# Patient Record
Sex: MALE | Race: BLACK OR AFRICAN AMERICAN | NOT HISPANIC OR LATINO | Employment: UNEMPLOYED | RURAL
[De-identification: names, ages, dates, MRNs, and addresses within clinical notes are randomized per-mention and may not be internally consistent; named-entity substitution may affect disease eponyms.]

---

## 2018-07-30 ENCOUNTER — HISTORICAL (OUTPATIENT)
Dept: ADMINISTRATIVE | Facility: HOSPITAL | Age: 38
End: 2018-07-30

## 2018-07-31 LAB
LAB AP CLINICAL INFORMATION: NORMAL
LAB AP COMMENTS: NORMAL
LAB AP DIAGNOSIS - HISTORICAL: NORMAL
LAB AP MICROSCOPIC PATHOLOGY - HISTORICAL: NORMAL
LAB AP SPECIMEN SUBMITTED - HISTORICAL: NORMAL

## 2018-08-10 LAB
LAB AP CLINICAL INFORMATION: NORMAL
LAB AP COMMENTS: NORMAL
LAB AP DIAGNOSIS - HISTORICAL: NORMAL
LAB AP GROSS PATHOLOGY - HISTORICAL: NORMAL
LAB AP SPECIMEN SUBMITTED - HISTORICAL: NORMAL

## 2020-03-10 ENCOUNTER — HISTORICAL (OUTPATIENT)
Dept: ADMINISTRATIVE | Facility: HOSPITAL | Age: 40
End: 2020-03-10

## 2020-03-10 LAB — VALPROATE SERPL-MCNC: 102 UG/ML (ref 50–100)

## 2020-03-18 ENCOUNTER — HISTORICAL (OUTPATIENT)
Dept: ADMINISTRATIVE | Facility: HOSPITAL | Age: 40
End: 2020-03-18

## 2020-03-18 LAB — VALPROATE SERPL-MCNC: 85 UG/ML (ref 50–100)

## 2020-03-24 ENCOUNTER — HISTORICAL (OUTPATIENT)
Dept: ADMINISTRATIVE | Facility: HOSPITAL | Age: 40
End: 2020-03-24

## 2020-03-24 LAB
TSH SERPL DL<=0.005 MIU/L-ACNC: 4.92 UIU/ML (ref 0.36–3.74)
VIT B12 SERPL-MCNC: 697 PG/ML (ref 193–986)

## 2020-03-26 ENCOUNTER — HISTORICAL (OUTPATIENT)
Dept: ADMINISTRATIVE | Facility: HOSPITAL | Age: 40
End: 2020-03-26

## 2020-03-27 ENCOUNTER — HISTORICAL (OUTPATIENT)
Dept: ADMINISTRATIVE | Facility: HOSPITAL | Age: 40
End: 2020-03-27

## 2020-03-27 LAB — T4 FREE SERPL-MCNC: 0.77 NG/DL (ref 0.76–1.46)

## 2020-03-31 ENCOUNTER — HISTORICAL (OUTPATIENT)
Dept: ADMINISTRATIVE | Facility: HOSPITAL | Age: 40
End: 2020-03-31

## 2020-03-31 LAB
VALPROATE SERPL-MCNC: 96 UG/ML (ref 50–100)
VIT B1 BLD-SCNC: 169 NMOL/L (ref 70–180)

## 2020-04-07 ENCOUNTER — HISTORICAL (OUTPATIENT)
Dept: ADMINISTRATIVE | Facility: HOSPITAL | Age: 40
End: 2020-04-07

## 2020-04-07 LAB — VALPROATE SERPL-MCNC: 92 UG/ML (ref 50–100)

## 2020-04-21 ENCOUNTER — HISTORICAL (OUTPATIENT)
Dept: ADMINISTRATIVE | Facility: HOSPITAL | Age: 40
End: 2020-04-21

## 2020-04-21 LAB — VALPROATE SERPL-MCNC: 86 UG/ML (ref 50–100)

## 2020-05-05 ENCOUNTER — HISTORICAL (OUTPATIENT)
Dept: ADMINISTRATIVE | Facility: HOSPITAL | Age: 40
End: 2020-05-05

## 2020-05-05 LAB
AMYLASE SERPL-CCNC: 72 U/L (ref 25–115)
CHOLEST SERPL-MCNC: 139 MG/DL
CHOLEST/HDLC SERPL: 3.9 {RATIO}
GLUCOSE SERPL-MCNC: 89 MG/DL (ref 74–106)
HDLC SERPL-MCNC: 36 MG/DL
LDLC SERPL CALC-MCNC: 74 MG/DL
LIPASE SERPL-CCNC: 163 U/L (ref 73–393)
TRIGL SERPL-MCNC: 147 MG/DL
VALPROATE SERPL-MCNC: 81 UG/ML (ref 50–100)

## 2020-05-18 ENCOUNTER — HISTORICAL (OUTPATIENT)
Dept: ADMINISTRATIVE | Facility: HOSPITAL | Age: 40
End: 2020-05-18

## 2020-05-18 LAB
TSH SERPL DL<=0.005 MIU/L-ACNC: 2.5 UIU/ML (ref 0.36–3.74)
VALPROATE SERPL-MCNC: 76 UG/ML (ref 50–100)

## 2020-08-25 ENCOUNTER — HISTORICAL (OUTPATIENT)
Dept: ADMINISTRATIVE | Facility: HOSPITAL | Age: 40
End: 2020-08-25

## 2020-08-25 LAB — SARS-COV+SARS-COV-2 AG RESP QL IA.RAPID: NEGATIVE

## 2021-02-08 ENCOUNTER — HISTORICAL (OUTPATIENT)
Dept: ADMINISTRATIVE | Facility: HOSPITAL | Age: 41
End: 2021-02-08

## 2021-02-08 LAB — SARS-COV-2 RNA AMPLIFICATION, QUAL: NEGATIVE

## 2021-09-02 ENCOUNTER — OFFICE VISIT (OUTPATIENT)
Dept: FAMILY MEDICINE | Facility: CLINIC | Age: 41
End: 2021-09-02
Payer: MEDICAID

## 2021-09-02 VITALS
BODY MASS INDEX: 32.47 KG/M2 | HEIGHT: 74 IN | HEART RATE: 98 BPM | DIASTOLIC BLOOD PRESSURE: 86 MMHG | SYSTOLIC BLOOD PRESSURE: 136 MMHG | OXYGEN SATURATION: 98 % | WEIGHT: 253 LBS

## 2021-09-02 DIAGNOSIS — E03.9 HYPOTHYROIDISM, UNSPECIFIED TYPE: Primary | ICD-10-CM

## 2021-09-02 DIAGNOSIS — F20.9 SCHIZOPHRENIA, UNSPECIFIED TYPE: ICD-10-CM

## 2021-09-02 PROCEDURE — 99213 PR OFFICE/OUTPT VISIT, EST, LEVL III, 20-29 MIN: ICD-10-PCS | Mod: ,,, | Performed by: NURSE PRACTITIONER

## 2021-09-02 PROCEDURE — 99213 OFFICE O/P EST LOW 20 MIN: CPT | Mod: ,,, | Performed by: NURSE PRACTITIONER

## 2021-09-02 RX ORDER — DIVALPROEX SODIUM 500 MG/1
TABLET, DELAYED RELEASE ORAL
COMMUNITY
Start: 2021-08-13 | End: 2024-01-11 | Stop reason: CLARIF

## 2021-09-02 RX ORDER — BENZTROPINE MESYLATE 2 MG/1
TABLET ORAL
COMMUNITY
Start: 2021-08-13 | End: 2023-09-20

## 2021-09-02 RX ORDER — HALOPERIDOL DECANOATE 100 MG/ML
INJECTION INTRAMUSCULAR
COMMUNITY
Start: 2021-08-11 | End: 2024-01-11 | Stop reason: CLARIF

## 2021-09-02 RX ORDER — HALOPERIDOL 5 MG/1
TABLET ORAL
COMMUNITY
Start: 2021-08-13 | End: 2023-09-20

## 2021-09-02 RX ORDER — LEVOTHYROXINE SODIUM 50 UG/1
TABLET ORAL
COMMUNITY
Start: 2021-07-13 | End: 2021-09-02 | Stop reason: SDUPTHER

## 2021-09-02 RX ORDER — LEVOTHYROXINE SODIUM 50 UG/1
50 TABLET ORAL
Qty: 90 TABLET | Refills: 1 | Status: SHIPPED | OUTPATIENT
Start: 2021-09-02 | End: 2022-06-27 | Stop reason: SDUPTHER

## 2021-09-02 RX ORDER — TRAZODONE HYDROCHLORIDE 50 MG/1
TABLET ORAL
COMMUNITY
Start: 2021-08-13

## 2021-09-02 RX ORDER — DIPHENHYDRAMINE HYDROCHLORIDE 25 MG/1
CAPSULE ORAL
COMMUNITY
Start: 2021-08-13 | End: 2024-01-11 | Stop reason: CLARIF

## 2022-05-01 ENCOUNTER — HOSPITAL ENCOUNTER (EMERGENCY)
Facility: HOSPITAL | Age: 42
Discharge: HOME | End: 2022-05-01
Payer: MEDICAID

## 2022-05-01 VITALS
HEIGHT: 74 IN | WEIGHT: 250 LBS | DIASTOLIC BLOOD PRESSURE: 81 MMHG | BODY MASS INDEX: 32.08 KG/M2 | SYSTOLIC BLOOD PRESSURE: 162 MMHG | HEART RATE: 65 BPM | OXYGEN SATURATION: 100 % | RESPIRATION RATE: 18 BRPM | TEMPERATURE: 98 F

## 2022-05-01 DIAGNOSIS — H92.02 EARACHE ON LEFT: Primary | ICD-10-CM

## 2022-05-01 PROCEDURE — 99282 PR EMERGENCY DEPT VISIT,LEVEL II: ICD-10-PCS | Mod: ,,,

## 2022-05-01 PROCEDURE — 99283 EMERGENCY DEPT VISIT LOW MDM: CPT

## 2022-05-01 PROCEDURE — 99282 EMERGENCY DEPT VISIT SF MDM: CPT | Mod: ,,,

## 2022-05-01 PROCEDURE — 25000003 PHARM REV CODE 250

## 2022-05-01 RX ORDER — ACETAMINOPHEN 500 MG
1000 TABLET ORAL
Status: COMPLETED | OUTPATIENT
Start: 2022-05-01 | End: 2022-05-01

## 2022-05-01 RX ADMIN — ACETAMINOPHEN 1000 MG: 500 TABLET ORAL at 12:05

## 2022-05-01 NOTE — DISCHARGE INSTRUCTIONS
Follow-up with [facility] today, 05/01/22 or with Dr. Gomez on Monday, 05/02/22. Return to Emergency Department for any new or worsening symptoms.

## 2022-05-01 NOTE — ED PROVIDER NOTES
Encounter Date: 5/1/2022       History     Chief Complaint   Patient presents with    Otalgia     40 yo AAM presents to ED with c/o left earache for extended period of time that he reports worsened 2 days ago. He has seen his PCP for this complaint in the past, but has not seen him since onset of this episode. He reports using Q-tips frequently in his ears and has been using eardrops at home. He denies any fever, cough, congestion, or other symptoms.    The history is provided by the patient.     Review of patient's allergies indicates:  No Known Allergies  Past Medical History:   Diagnosis Date    Hypothyroidism     Schizophrenia, unspecified      History reviewed. No pertinent surgical history.  History reviewed. No pertinent family history.  Social History     Tobacco Use    Smoking status: Current Every Day Smoker     Packs/day: 0.50     Types: Cigarettes    Smokeless tobacco: Never Used   Substance Use Topics    Alcohol use: Never    Drug use: Never     Review of Systems   Constitutional: Negative for fever.   HENT: Positive for ear pain. Negative for congestion, ear discharge, rhinorrhea, sore throat and trouble swallowing.    Respiratory: Negative for shortness of breath.    Cardiovascular: Negative for chest pain.   Gastrointestinal: Negative for nausea.   Genitourinary: Negative for dysuria.   Musculoskeletal: Negative for back pain.   Skin: Negative for rash.   Neurological: Negative for weakness and headaches.   Hematological: Does not bruise/bleed easily.       Physical Exam     Initial Vitals [05/01/22 0034]   BP Pulse Resp Temp SpO2   (!) 162/81 65 18 98 °F (36.7 °C) 100 %      MAP       --         Physical Exam    Vitals reviewed.  Constitutional: He appears well-developed and well-nourished. No distress.   HENT:   Head: Normocephalic and atraumatic.   Right Ear: Tympanic membrane normal.   Left Ear: Tympanic membrane normal.   Mouth/Throat: Oropharynx is clear and moist.   Eyes: Conjunctivae  are normal. Pupils are equal, round, and reactive to light.   Neck: Neck supple.   Normal range of motion.  Cardiovascular: Normal rate, regular rhythm, normal heart sounds and intact distal pulses.   Pulmonary/Chest: Breath sounds normal. No respiratory distress.   Abdominal: Abdomen is soft. Bowel sounds are normal.   Musculoskeletal:         General: Normal range of motion.      Cervical back: Normal range of motion and neck supple.     Neurological: He is alert and oriented to person, place, and time. He has normal strength.   Skin: Skin is warm and dry.         Medical Screening Exam   See Full Note    ED Course   Procedures  Labs Reviewed - No data to display       Imaging Results    None          Medications   acetaminophen tablet 1,000 mg (1,000 mg Oral Given 5/1/22 0058)     Medical Decision Making:   ED Management:  Home care and f/u with urgent care or PCP discussed. Patient voiced understanding.                   Clinical Impression:   Final diagnoses:  [H92.02] Earache on left (Primary)          ED Disposition Condition    Discharge Stable        ED Prescriptions     None        Follow-up Information    None          NOBLE Booth  05/01/22 0103

## 2022-05-01 NOTE — ED TRIAGE NOTES
Presents to ER with left ear pain that started 2 days ago. Has used some type of ear drops for a couple of days. Consistently uses q tips to clean ear.  Has not taken any OTC oral meds. Has not seen PCP with this episode.

## 2022-06-27 ENCOUNTER — OFFICE VISIT (OUTPATIENT)
Dept: FAMILY MEDICINE | Facility: CLINIC | Age: 42
End: 2022-06-27
Payer: MEDICAID

## 2022-06-27 VITALS
HEIGHT: 72 IN | DIASTOLIC BLOOD PRESSURE: 78 MMHG | SYSTOLIC BLOOD PRESSURE: 130 MMHG | BODY MASS INDEX: 32.64 KG/M2 | OXYGEN SATURATION: 98 % | HEART RATE: 83 BPM | WEIGHT: 241 LBS

## 2022-06-27 DIAGNOSIS — H61.22 IMPACTED CERUMEN OF LEFT EAR: ICD-10-CM

## 2022-06-27 DIAGNOSIS — E03.8 OTHER SPECIFIED HYPOTHYROIDISM: Primary | ICD-10-CM

## 2022-06-27 PROCEDURE — 1160F PR REVIEW ALL MEDS BY PRESCRIBER/CLIN PHARMACIST DOCUMENTED: ICD-10-PCS | Mod: CPTII,,, | Performed by: FAMILY MEDICINE

## 2022-06-27 PROCEDURE — 3078F PR MOST RECENT DIASTOLIC BLOOD PRESSURE < 80 MM HG: ICD-10-PCS | Mod: CPTII,,, | Performed by: FAMILY MEDICINE

## 2022-06-27 PROCEDURE — 3008F PR BODY MASS INDEX (BMI) DOCUMENTED: ICD-10-PCS | Mod: CPTII,,, | Performed by: FAMILY MEDICINE

## 2022-06-27 PROCEDURE — 3075F SYST BP GE 130 - 139MM HG: CPT | Mod: CPTII,,, | Performed by: FAMILY MEDICINE

## 2022-06-27 PROCEDURE — 3008F BODY MASS INDEX DOCD: CPT | Mod: CPTII,,, | Performed by: FAMILY MEDICINE

## 2022-06-27 PROCEDURE — 1159F MED LIST DOCD IN RCRD: CPT | Mod: CPTII,,, | Performed by: FAMILY MEDICINE

## 2022-06-27 PROCEDURE — 1160F RVW MEDS BY RX/DR IN RCRD: CPT | Mod: CPTII,,, | Performed by: FAMILY MEDICINE

## 2022-06-27 PROCEDURE — 3078F DIAST BP <80 MM HG: CPT | Mod: CPTII,,, | Performed by: FAMILY MEDICINE

## 2022-06-27 PROCEDURE — 3075F PR MOST RECENT SYSTOLIC BLOOD PRESS GE 130-139MM HG: ICD-10-PCS | Mod: CPTII,,, | Performed by: FAMILY MEDICINE

## 2022-06-27 PROCEDURE — 99203 OFFICE O/P NEW LOW 30 MIN: CPT | Mod: ,,, | Performed by: FAMILY MEDICINE

## 2022-06-27 PROCEDURE — 99203 PR OFFICE/OUTPT VISIT, NEW, LEVL III, 30-44 MIN: ICD-10-PCS | Mod: ,,, | Performed by: FAMILY MEDICINE

## 2022-06-27 PROCEDURE — 1159F PR MEDICATION LIST DOCUMENTED IN MEDICAL RECORD: ICD-10-PCS | Mod: CPTII,,, | Performed by: FAMILY MEDICINE

## 2022-06-27 RX ORDER — LEVOTHYROXINE SODIUM 50 UG/1
50 TABLET ORAL
Qty: 90 TABLET | Refills: 1 | Status: SHIPPED | OUTPATIENT
Start: 2022-06-27 | End: 2023-05-02 | Stop reason: SDUPTHER

## 2022-06-27 NOTE — PROGRESS NOTES
[facility] Medicine    Chief Complaint      Chief Complaint   Patient presents with    Medication Refill       History of Present Illness      Oli Lee is a 42 y.o. male with chronic conditions of schizophrenia and hypothyroidism who presents today for med refills. Pt states he has been out of levothyroxine for approx 2 months. States he tried to get med refills earlier, but he had not been seen by his PCP in over 1 year. States he is compliant with medications otherwise. Today also has c/o left ear fullness. States symptoms present the last few weeks duration. Noted occasional mild ear pain and decreased hearing.    Past Medical History:  Past Medical History:   Diagnosis Date    Hypothyroidism     Schizophrenia, unspecified        Past Surgical History:   has no past surgical history on file.    Social History:  Social History     Tobacco Use    Smoking status: Current Every Day Smoker     Packs/day: 0.50     Types: Cigarettes    Smokeless tobacco: Never Used   Substance Use Topics    Alcohol use: Never    Drug use: Never       I personally reviewed all past medical, surgical, and social.     Review of Systems   Constitutional: Negative for fatigue and fever.   HENT: Positive for ear pain.    Eyes: Negative for pain and visual disturbance.   Respiratory: Negative for chest tightness and shortness of breath.    Cardiovascular: Negative for chest pain and leg swelling.   Gastrointestinal: Negative for abdominal pain.   Genitourinary: Negative for difficulty urinating.   Musculoskeletal: Negative for gait problem and myalgias.   Skin: Negative for rash.   Neurological: Negative for dizziness and light-headedness.   Hematological: Does not bruise/bleed easily.        Medications:  Outpatient Encounter Medications as of 6/27/2022   Medication Sig Dispense Refill    BANOPHEN 25 mg capsule       benztropine (COGENTIN) 2 MG Tab       divalproex (DEPAKOTE) 500 MG TbEC       haloperidoL (HALDOL) 5  MG tablet       haloperidol decanoate (HALDOL DECANOATE) 100 mg/mL injection       traZODone (DESYREL) 50 MG tablet       levothyroxine (SYNTHROID) 50 MCG tablet Take 1 tablet (50 mcg total) by mouth before breakfast. 90 tablet 1    [DISCONTINUED] levothyroxine (SYNTHROID) 50 MCG tablet Take 1 tablet (50 mcg total) by mouth before breakfast. 90 tablet 1     No facility-administered encounter medications on file as of 6/27/2022.       Allergies:  Review of patient's allergies indicates:  No Known Allergies    Health Maintenance:    There is no immunization history on file for this patient.   Health Maintenance   Topic Date Due    Hepatitis C Screening  Never done    Lipid Panel  Never done    TETANUS VACCINE  Never done        Physical Exam      Vital Signs  Pulse: 83  SpO2: 98 %  BP: 130/78  Height and Weight  Height: 6' (182.9 cm)  Weight: 109.3 kg (241 lb)  BSA (Calculated - sq m): 2.36 sq meters  BMI (Calculated): 32.7  Weight in (lb) to have BMI = 25: 183.9]    Physical Exam  Constitutional:       Appearance: Normal appearance.   HENT:      Head: Normocephalic.      Right Ear: Tympanic membrane and ear canal normal.      Left Ear: There is impacted cerumen.   Eyes:      Conjunctiva/sclera: Conjunctivae normal.      Pupils: Pupils are equal, round, and reactive to light.   Cardiovascular:      Rate and Rhythm: Normal rate and regular rhythm.      Pulses: Normal pulses.   Pulmonary:      Effort: Pulmonary effort is normal.      Breath sounds: Normal breath sounds.   Abdominal:      General: Bowel sounds are normal. There is no distension.      Palpations: Abdomen is soft.   Musculoskeletal:         General: Normal range of motion.      Right lower leg: No edema.      Left lower leg: No edema.   Skin:     General: Skin is warm and dry.   Neurological:      General: No focal deficit present.      Mental Status: He is alert and oriented to person, place, and time.   Psychiatric:         Mood and Affect: Mood  normal.          Laboratory:  CBC:      CMP:  Recent Labs   Lab 05/05/20  0530   Glucose 89     LIPIDS:  Recent Labs   Lab 03/24/20  0505 05/05/20  0530 05/18/20  0630   TSH 4.920 H  --  2.500   HDL Cholesterol  --  36  --    Cholesterol  --  139  --    Triglycerides  --  147  --    LDL Calculated  --  74  --    Cholesterol/HDL Ratio (Risk Factor)  --  3.9  --      TSH:  Recent Labs   Lab 03/24/20  0505 05/18/20  0630   TSH 4.920 H 2.500     A1C:        Assessment/Plan     Oli Lee is a 42 y.o.male with:     1. Other specified hypothyroidism  - Will refill levothyroxine 50mcg daily  - Encouraged to follow up with his PCP in 4-6 weeks to have TSH checked    2. Impacted cerumen of left ear  - Ambulatory referral/consult to ENT; Future       Total time spent face-to-face and non-face-to-face coordinating care for this encounter was: 30 min    Chronic conditions status updated as per HPI.  Other than changes above, cont current medications and maintain follow up with specialists.  Return to clinic PRN.    Carmelo Bradford DO  [facility] Med

## 2022-06-29 ENCOUNTER — OFFICE VISIT (OUTPATIENT)
Dept: OTOLARYNGOLOGY | Facility: CLINIC | Age: 42
End: 2022-06-29
Payer: MEDICAID

## 2022-06-29 VITALS — HEIGHT: 72 IN | WEIGHT: 241 LBS | BODY MASS INDEX: 32.64 KG/M2

## 2022-06-29 DIAGNOSIS — H66.92 LEFT OTITIS MEDIA, UNSPECIFIED OTITIS MEDIA TYPE: Primary | ICD-10-CM

## 2022-06-29 DIAGNOSIS — H61.22 IMPACTED CERUMEN OF LEFT EAR: ICD-10-CM

## 2022-06-29 DIAGNOSIS — H65.22 LEFT CHRONIC SEROUS OTITIS MEDIA: ICD-10-CM

## 2022-06-29 PROCEDURE — 99204 PR OFFICE/OUTPT VISIT, NEW, LEVL IV, 45-59 MIN: ICD-10-PCS | Mod: S$PBB,,, | Performed by: OTOLARYNGOLOGY

## 2022-06-29 PROCEDURE — 1159F PR MEDICATION LIST DOCUMENTED IN MEDICAL RECORD: ICD-10-PCS | Mod: CPTII,,, | Performed by: OTOLARYNGOLOGY

## 2022-06-29 PROCEDURE — 1159F MED LIST DOCD IN RCRD: CPT | Mod: CPTII,,, | Performed by: OTOLARYNGOLOGY

## 2022-06-29 PROCEDURE — 3008F BODY MASS INDEX DOCD: CPT | Mod: CPTII,,, | Performed by: OTOLARYNGOLOGY

## 2022-06-29 PROCEDURE — 99204 OFFICE O/P NEW MOD 45 MIN: CPT | Mod: S$PBB,,, | Performed by: OTOLARYNGOLOGY

## 2022-06-29 PROCEDURE — 99213 OFFICE O/P EST LOW 20 MIN: CPT | Mod: PBBFAC | Performed by: OTOLARYNGOLOGY

## 2022-06-29 PROCEDURE — 3008F PR BODY MASS INDEX (BMI) DOCUMENTED: ICD-10-PCS | Mod: CPTII,,, | Performed by: OTOLARYNGOLOGY

## 2022-06-29 PROCEDURE — 1160F RVW MEDS BY RX/DR IN RCRD: CPT | Mod: CPTII,,, | Performed by: OTOLARYNGOLOGY

## 2022-06-29 PROCEDURE — 1160F PR REVIEW ALL MEDS BY PRESCRIBER/CLIN PHARMACIST DOCUMENTED: ICD-10-PCS | Mod: CPTII,,, | Performed by: OTOLARYNGOLOGY

## 2022-06-29 RX ORDER — AMOXICILLIN AND CLAVULANATE POTASSIUM 500; 125 MG/1; MG/1
1 TABLET, FILM COATED ORAL 2 TIMES DAILY
Qty: 28 TABLET | Refills: 0 | Status: SHIPPED | OUTPATIENT
Start: 2022-06-29 | End: 2023-05-02 | Stop reason: ALTCHOICE

## 2022-06-29 RX ORDER — NEOMYCIN SULFATE, POLYMYXIN B SULFATE AND HYDROCORTISONE 10; 3.5; 1 MG/ML; MG/ML; [USP'U]/ML
3 SUSPENSION/ DROPS AURICULAR (OTIC) 2 TIMES DAILY
Qty: 10 ML | Refills: 0 | Status: SHIPPED | OUTPATIENT
Start: 2022-06-29 | End: 2023-05-02

## 2022-06-29 NOTE — PROGRESS NOTES
Subjective:       Patient ID: Oli Lee is a 42 y.o. male.    Chief Complaint: Cerumen Impaction (Patient states his left ear feels impacted.) and Otalgia (Patient has some ear pain.)    HPI  Review of Systems   HENT: Positive for ear pain and hearing loss.    All other systems reviewed and are negative.      Objective:      Physical Exam  General: NAD  Head: Normocephalic, atraumatic, no facial asymmetry/normal strength,  Ears: Both auricules normal in appearance, w/o deformities tympanic membranes red with fluid right small amt of wax  external auditory canals normal  Nose: External nose w/o deformities normal turbinates no drainage or inflammation  Oral Cavity: Lips, gums, floor of mouth, tongue hard palate, and buccal mucosa without mass/lesion  Oropharynx: Mucosa pink and moist, soft palate, posterior pharynx and oropharyngeal wall without mass/lesion  Neck: Supple, symmetric, trachea midline, no palpable mass/lesion, no palpable cervical lymphadenopathy  Skin: Warm and dry, no concerning lesions  Respiratory: Respirations even, unlabored    Assessment:       1. Left otitis media, unspecified otitis media type    2. Impacted cerumen of left ear    3. Left chronic serous otitis media        Plan:       Augmentin CSP drops    f/u 2 weeks

## 2022-07-13 ENCOUNTER — OFFICE VISIT (OUTPATIENT)
Dept: OTOLARYNGOLOGY | Facility: CLINIC | Age: 42
End: 2022-07-13
Payer: MEDICAID

## 2022-07-13 VITALS — HEIGHT: 72 IN | WEIGHT: 241 LBS | BODY MASS INDEX: 32.64 KG/M2

## 2022-07-13 DIAGNOSIS — H90.2 CONDUCTIVE HEARING LOSS, UNSPECIFIED LATERALITY: ICD-10-CM

## 2022-07-13 DIAGNOSIS — H61.22 HEARING LOSS DUE TO CERUMEN IMPACTION, LEFT: Primary | ICD-10-CM

## 2022-07-13 DIAGNOSIS — H65.22 LEFT CHRONIC SEROUS OTITIS MEDIA: ICD-10-CM

## 2022-07-13 PROCEDURE — 1159F MED LIST DOCD IN RCRD: CPT | Mod: CPTII,,, | Performed by: OTOLARYNGOLOGY

## 2022-07-13 PROCEDURE — 99214 PR OFFICE/OUTPT VISIT, EST, LEVL IV, 30-39 MIN: ICD-10-PCS | Mod: S$PBB,25,, | Performed by: OTOLARYNGOLOGY

## 2022-07-13 PROCEDURE — 99213 OFFICE O/P EST LOW 20 MIN: CPT | Mod: PBBFAC | Performed by: OTOLARYNGOLOGY

## 2022-07-13 PROCEDURE — 69210 REMOVE IMPACTED EAR WAX UNI: CPT | Mod: S$PBB,,, | Performed by: OTOLARYNGOLOGY

## 2022-07-13 PROCEDURE — 99214 OFFICE O/P EST MOD 30 MIN: CPT | Mod: S$PBB,25,, | Performed by: OTOLARYNGOLOGY

## 2022-07-13 PROCEDURE — 3008F BODY MASS INDEX DOCD: CPT | Mod: CPTII,,, | Performed by: OTOLARYNGOLOGY

## 2022-07-13 PROCEDURE — 1159F PR MEDICATION LIST DOCUMENTED IN MEDICAL RECORD: ICD-10-PCS | Mod: CPTII,,, | Performed by: OTOLARYNGOLOGY

## 2022-07-13 PROCEDURE — 69210 REMOVE IMPACTED EAR WAX UNI: CPT | Mod: PBBFAC | Performed by: OTOLARYNGOLOGY

## 2022-07-13 PROCEDURE — 1160F PR REVIEW ALL MEDS BY PRESCRIBER/CLIN PHARMACIST DOCUMENTED: ICD-10-PCS | Mod: CPTII,,, | Performed by: OTOLARYNGOLOGY

## 2022-07-13 PROCEDURE — 3008F PR BODY MASS INDEX (BMI) DOCUMENTED: ICD-10-PCS | Mod: CPTII,,, | Performed by: OTOLARYNGOLOGY

## 2022-07-13 PROCEDURE — 1160F RVW MEDS BY RX/DR IN RCRD: CPT | Mod: CPTII,,, | Performed by: OTOLARYNGOLOGY

## 2022-07-13 PROCEDURE — 69210 PR REMOVAL IMPACTED CERUMEN REQUIRING INSTRUMENTATION, UNILATERAL: ICD-10-PCS | Mod: S$PBB,,, | Performed by: OTOLARYNGOLOGY

## 2022-07-13 NOTE — PROGRESS NOTES
Subjective:       Patient ID: Oli Lee is a 42 y.o. male.    Chief Complaint: Otitis Media (Left ear. Pt states left ear is feeling much better, not as full as it was 2 weeks ago. )    HPI  Review of Systems   HENT: Positive for ear discharge, ear pain and hearing loss.    All other systems reviewed and are negative.      Objective:      Physical Exam  General: NAD  Head: Normocephalic, atraumatic, no facial asymmetry/normal strength,  Ears: Both auricules normal in appearance, w/o deformities tympanic membranes normal external auditory canals left wax impaction cleared   Nose: External nose w/o deformities normal turbinates no drainage or inflammation  Oral Cavity: Lips, gums, floor of mouth, tongue hard palate, and buccal mucosa without mass/lesion  Oropharynx: Mucosa pink and moist, soft palate, posterior pharynx and oropharyngeal wall without mass/lesion  Neck: Supple, symmetric, trachea midline, no palpable mass/lesion, no palpable cervical lymphadenopathy  Skin: Warm and dry, no concerning lesions  Respiratory: Respirations even, unlabored    Procedure: Binocular microscopy with removal of cerumen impaction using microsurgical instrumentation.  After explaining the procedure and obtaining verbal assent,  The left  external auditory canal visualized with the 250 mm focal length lens through the operating microscope. The obstructing cerumen was removed with microsurgical instrumentation to reveal normal and healthy external auditory canal. The patient tolerated this procedure well without complication.        Assessment:       1. Hearing loss due to cerumen impaction, left    2. Conductive hearing loss, unspecified laterality    3. Left chronic serous otitis media        Plan:       F/u 1 month

## 2022-11-08 ENCOUNTER — OFFICE VISIT (OUTPATIENT)
Dept: FAMILY MEDICINE | Facility: CLINIC | Age: 42
End: 2022-11-08
Payer: MEDICAID

## 2022-11-08 VITALS
HEART RATE: 84 BPM | HEIGHT: 72 IN | DIASTOLIC BLOOD PRESSURE: 89 MMHG | WEIGHT: 241 LBS | BODY MASS INDEX: 32.64 KG/M2 | SYSTOLIC BLOOD PRESSURE: 137 MMHG

## 2022-11-08 DIAGNOSIS — E03.9 HYPOTHYROIDISM, UNSPECIFIED TYPE: Chronic | ICD-10-CM

## 2022-11-08 DIAGNOSIS — H04.121 DRY EYE OF RIGHT SIDE: Primary | Chronic | ICD-10-CM

## 2022-11-08 DIAGNOSIS — Z79.899 ENCOUNTER FOR LONG-TERM (CURRENT) USE OF MEDICATIONS: ICD-10-CM

## 2022-11-08 DIAGNOSIS — Z11.59 SCREENING FOR VIRAL DISEASE: ICD-10-CM

## 2022-11-08 DIAGNOSIS — Z13.220 SCREENING, LIPID: ICD-10-CM

## 2022-11-08 PROCEDURE — 1159F PR MEDICATION LIST DOCUMENTED IN MEDICAL RECORD: ICD-10-PCS | Mod: CPTII,,, | Performed by: FAMILY MEDICINE

## 2022-11-08 PROCEDURE — 3075F SYST BP GE 130 - 139MM HG: CPT | Mod: CPTII,,, | Performed by: FAMILY MEDICINE

## 2022-11-08 PROCEDURE — 99214 PR OFFICE/OUTPT VISIT, EST, LEVL IV, 30-39 MIN: ICD-10-PCS | Mod: ,,, | Performed by: FAMILY MEDICINE

## 2022-11-08 PROCEDURE — 1160F RVW MEDS BY RX/DR IN RCRD: CPT | Mod: CPTII,,, | Performed by: FAMILY MEDICINE

## 2022-11-08 PROCEDURE — 1159F MED LIST DOCD IN RCRD: CPT | Mod: CPTII,,, | Performed by: FAMILY MEDICINE

## 2022-11-08 PROCEDURE — 3008F BODY MASS INDEX DOCD: CPT | Mod: CPTII,,, | Performed by: FAMILY MEDICINE

## 2022-11-08 PROCEDURE — 3075F PR MOST RECENT SYSTOLIC BLOOD PRESS GE 130-139MM HG: ICD-10-PCS | Mod: CPTII,,, | Performed by: FAMILY MEDICINE

## 2022-11-08 PROCEDURE — 1160F PR REVIEW ALL MEDS BY PRESCRIBER/CLIN PHARMACIST DOCUMENTED: ICD-10-PCS | Mod: CPTII,,, | Performed by: FAMILY MEDICINE

## 2022-11-08 PROCEDURE — 3079F PR MOST RECENT DIASTOLIC BLOOD PRESSURE 80-89 MM HG: ICD-10-PCS | Mod: CPTII,,, | Performed by: FAMILY MEDICINE

## 2022-11-08 PROCEDURE — 99214 OFFICE O/P EST MOD 30 MIN: CPT | Mod: ,,, | Performed by: FAMILY MEDICINE

## 2022-11-08 PROCEDURE — 3079F DIAST BP 80-89 MM HG: CPT | Mod: CPTII,,, | Performed by: FAMILY MEDICINE

## 2022-11-08 PROCEDURE — 3008F PR BODY MASS INDEX (BMI) DOCUMENTED: ICD-10-PCS | Mod: CPTII,,, | Performed by: FAMILY MEDICINE

## 2022-11-08 RX ORDER — CYCLOSPORINE 0.5 MG/ML
1 EMULSION OPHTHALMIC 2 TIMES DAILY
Qty: 60 EACH | Refills: 11 | Status: SHIPPED | OUTPATIENT
Start: 2022-11-08 | End: 2023-08-29 | Stop reason: SDUPTHER
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
## 2022-11-14 PROBLEM — H04.121 DRY EYE OF RIGHT SIDE: Chronic | Status: ACTIVE | Noted: 2022-11-14

## 2022-11-14 PROBLEM — E03.9 HYPOTHYROIDISM: Chronic | Status: ACTIVE | Noted: 2022-11-14

## 2023-01-04 ENCOUNTER — HOSPITAL ENCOUNTER (EMERGENCY)
Facility: HOSPITAL | Age: 43
Discharge: HOME | End: 2023-01-04
Payer: MEDICAID

## 2023-01-04 VITALS
BODY MASS INDEX: 31.15 KG/M2 | HEART RATE: 74 BPM | OXYGEN SATURATION: 100 % | DIASTOLIC BLOOD PRESSURE: 76 MMHG | SYSTOLIC BLOOD PRESSURE: 150 MMHG | HEIGHT: 72 IN | RESPIRATION RATE: 18 BRPM | TEMPERATURE: 98 F | WEIGHT: 230 LBS

## 2023-01-04 DIAGNOSIS — M25.512 ACUTE PAIN OF LEFT SHOULDER: Primary | ICD-10-CM

## 2023-01-04 PROCEDURE — 99283 EMERGENCY DEPT VISIT LOW MDM: CPT | Mod: ,,, | Performed by: NURSE PRACTITIONER

## 2023-01-04 PROCEDURE — 96372 THER/PROPH/DIAG INJ SC/IM: CPT | Performed by: NURSE PRACTITIONER

## 2023-01-04 PROCEDURE — 99283 PR EMERGENCY DEPT VISIT,LEVEL III: ICD-10-PCS | Mod: ,,, | Performed by: NURSE PRACTITIONER

## 2023-01-04 PROCEDURE — 99284 EMERGENCY DEPT VISIT MOD MDM: CPT

## 2023-01-04 PROCEDURE — 63600175 PHARM REV CODE 636 W HCPCS: Performed by: NURSE PRACTITIONER

## 2023-01-04 RX ORDER — KETOROLAC TROMETHAMINE 30 MG/ML
30 INJECTION, SOLUTION INTRAMUSCULAR; INTRAVENOUS
Status: COMPLETED | OUTPATIENT
Start: 2023-01-04 | End: 2023-01-04

## 2023-01-04 RX ADMIN — KETOROLAC TROMETHAMINE 30 MG: 30 INJECTION, SOLUTION INTRAMUSCULAR; INTRAVENOUS at 10:01

## 2023-01-04 NOTE — ED PROVIDER NOTES
Encounter Date: 1/4/2023       History     Chief Complaint   Patient presents with    Shoulder Pain     left shoulder     Presented with c/o pain to anterior left shoulder that started yesterday. Denies known injury previous or recent. States pain worse with movement, mostly abduction. Has not taken any med for pain.    Review of patient's allergies indicates:  No Known Allergies  Past Medical History:   Diagnosis Date    Hypothyroidism     Schizophrenia, unspecified      History reviewed. No pertinent surgical history.  History reviewed. No pertinent family history.  Social History     Tobacco Use    Smoking status: Every Day     Packs/day: 1.00     Types: Cigarettes    Smokeless tobacco: Never   Substance Use Topics    Alcohol use: Yes     Alcohol/week: 2.0 standard drinks     Types: 2 Cans of beer per week    Drug use: Yes     Frequency: 3.0 times per week     Types: Marijuana     Review of Systems   Constitutional:  Negative for activity change, fatigue and fever.   HENT: Negative.     Respiratory: Negative.  Negative for cough, chest tightness, shortness of breath and wheezing.    Cardiovascular:  Negative for chest pain, palpitations and leg swelling.   Gastrointestinal:  Negative for abdominal pain, diarrhea, nausea and vomiting.   Genitourinary: Negative.    Musculoskeletal:  Positive for arthralgias (left shoulder).   Skin:  Negative for rash and wound.   Neurological: Negative.    Hematological:  Negative for adenopathy.   Psychiatric/Behavioral: Negative.       Physical Exam     Initial Vitals [01/04/23 0925]   BP Pulse Resp Temp SpO2   (!) 141/84 85 20 98.1 °F (36.7 °C) 97 %      MAP       --         Physical Exam    Nursing note and vitals reviewed.  Constitutional: He appears well-developed and well-nourished. No distress.   HENT:   Head: Normocephalic.   Right Ear: External ear normal.   Left Ear: External ear normal.   Nose: Nose normal.   Mouth/Throat: Oropharynx is clear and moist.   Eyes:  Conjunctivae and EOM are normal. Pupils are equal, round, and reactive to light.   Neck: Neck supple.   Normal range of motion.  Cardiovascular:  Normal rate, regular rhythm, normal heart sounds and intact distal pulses.           No murmur heard.  Pulmonary/Chest: Breath sounds normal.   Abdominal: Abdomen is soft. Bowel sounds are normal.   Musculoskeletal:         General: Normal range of motion.      Left shoulder: Tenderness present.        Arms:       Cervical back: Normal range of motion and neck supple.     Neurological: He is alert and oriented to person, place, and time. He has normal strength. GCS score is 15. GCS eye subscore is 4. GCS verbal subscore is 5. GCS motor subscore is 6.   Skin: Skin is warm and dry. Capillary refill takes less than 2 seconds.   Psychiatric: He has a normal mood and affect.       Medical Screening Exam   See Full Note    ED Course   Procedures  Labs Reviewed - No data to display       Imaging Results              X-Ray Shoulder 2 or More Views Left (Final result)  Result time 01/04/23 09:55:46      Final result by Andres Nunn DO (01/04/23 09:55:46)                   Impression:      No acute findings    Mild degenerative change      Electronically signed by: Andres Nunn  Date:    01/04/2023  Time:    09:55               Narrative:    EXAMINATION:  XR SHOULDER COMPLETE 2 OR MORE VIEWS LEFT    CLINICAL HISTORY:  left shoulder pain;    TECHNIQUE:  XR SHOULDER COMPLETE 2 OR MORE VIEWS LEFT    COMPARISON:  2018    FINDINGS:  No acute fracture or dislocation.    Mild degenerative change    No radiopaque foreign bodies.                                    X-Rays:   Independently Interpreted Readings:   Other Readings:  Left shoulder xray: No acute findings     Mild degenerative change  Medications   ketorolac injection 30 mg (30 mg Intramuscular Given 1/4/23 1018)     Medical Decision Making:   ED Management:  Findings on exam indicative of biceps tendinitis. Explained  diagnosis and treatment at home. Instructed to follow-up with PCP in 5-7 days if not better.                 Clinical Impression:   Final diagnoses:  [M25.512] Acute pain of left shoulder (Primary)        ED Disposition Condition    Discharge Stable          ED Prescriptions    None       Follow-up Information       Follow up With Specialties Details Why Contact Info    Danny Gomez MD Family Medicine In 5 days IF pain persists. [address] [place] [address]  [facility] [place] [address]  [phone]               Tammie Estrada NP  01/04/23 [id number]

## 2023-01-04 NOTE — DISCHARGE INSTRUCTIONS
No heavy lifting with left arm. Apply ice pack to area for 20 minutes 4-6 times per day. Take Ibuprofen 600-800 mg every 8 hours as needed for pain. If pain persists in 5-7 days, follow-up with your PCP.

## 2023-01-04 NOTE — ED TRIAGE NOTES
Left shoulder pain started 1 day ago and got worse last night, denies injury, no heavy lifting, rates pain 8 on a 0-10 scale, worse with movement, stabbing pain

## 2023-05-02 ENCOUNTER — OFFICE VISIT (OUTPATIENT)
Dept: FAMILY MEDICINE | Facility: CLINIC | Age: 43
End: 2023-05-02
Payer: MEDICAID

## 2023-05-02 VITALS
HEART RATE: 73 BPM | BODY MASS INDEX: 31.15 KG/M2 | WEIGHT: 230 LBS | HEIGHT: 72 IN | DIASTOLIC BLOOD PRESSURE: 85 MMHG | SYSTOLIC BLOOD PRESSURE: 137 MMHG

## 2023-05-02 DIAGNOSIS — E03.9 ACQUIRED HYPOTHYROIDISM: Primary | Chronic | ICD-10-CM

## 2023-05-02 DIAGNOSIS — T16.2XXA FOREIGN BODY OF LEFT EAR, INITIAL ENCOUNTER: ICD-10-CM

## 2023-05-02 PROCEDURE — 69200 PR REMV EXT CANAL FOREIGN BODY: ICD-10-PCS | Mod: LT,,, | Performed by: FAMILY MEDICINE

## 2023-05-02 PROCEDURE — 3079F PR MOST RECENT DIASTOLIC BLOOD PRESSURE 80-89 MM HG: ICD-10-PCS | Mod: CPTII,,, | Performed by: FAMILY MEDICINE

## 2023-05-02 PROCEDURE — 3079F DIAST BP 80-89 MM HG: CPT | Mod: CPTII,,, | Performed by: FAMILY MEDICINE

## 2023-05-02 PROCEDURE — 3075F SYST BP GE 130 - 139MM HG: CPT | Mod: CPTII,,, | Performed by: FAMILY MEDICINE

## 2023-05-02 PROCEDURE — 69200 CLEAR OUTER EAR CANAL: CPT | Mod: LT,,, | Performed by: FAMILY MEDICINE

## 2023-05-02 PROCEDURE — 99214 PR OFFICE/OUTPT VISIT, EST, LEVL IV, 30-39 MIN: ICD-10-PCS | Mod: 25,,, | Performed by: FAMILY MEDICINE

## 2023-05-02 PROCEDURE — 99214 OFFICE O/P EST MOD 30 MIN: CPT | Mod: 25,,, | Performed by: FAMILY MEDICINE

## 2023-05-02 PROCEDURE — 3075F PR MOST RECENT SYSTOLIC BLOOD PRESS GE 130-139MM HG: ICD-10-PCS | Mod: CPTII,,, | Performed by: FAMILY MEDICINE

## 2023-05-02 PROCEDURE — 3008F PR BODY MASS INDEX (BMI) DOCUMENTED: ICD-10-PCS | Mod: CPTII,,, | Performed by: FAMILY MEDICINE

## 2023-05-02 PROCEDURE — 3008F BODY MASS INDEX DOCD: CPT | Mod: CPTII,,, | Performed by: FAMILY MEDICINE

## 2023-05-02 RX ORDER — LEVOTHYROXINE SODIUM 50 UG/1
50 TABLET ORAL
Qty: 90 TABLET | Refills: 1 | Status: SHIPPED | OUTPATIENT
Start: 2023-05-02 | End: 2023-08-25 | Stop reason: SDUPTHER

## 2023-05-02 NOTE — PROGRESS NOTES
Danny Gomez MD   [facility]  MEDICAL GROUP OF [address] [place] [address]  [phone]      PATIENT NAME: Oli Lee  : 1980  DATE: 23  MRN: [id number]      Billing Provider: Danny Gomez MD  Level of Service: [place] OFFICE/OUTPT VISIT, EST, LEVL IV, 30-39 MIN  Patient PCP Information       Provider PCP Type    Danny Gomez MD General            Reason for Visit / Chief Complaint: Follow-up       Update PCP  Update Chief Complaint         History of Present Illness / Problem Focused Workflow     Oli Lee presents to the clinic with Follow-up       Follow up hypothyroidism.  Needs synthroid refilled.  Is complaining of left ear discomfort.  Says that he was cleaning ear with a q-tip and thinks that the tip came off in his ear.  I have reviewed his 3 most recent TSH values.  The last was <6 months ago and was wnl @ 2.580    Review of Systems     Review of Systems   Constitutional:  Negative for chills, fatigue and fever.   HENT:  Positive for ear pain. Negative for ear discharge, sinus pressure/congestion, sore throat and trouble swallowing.    Respiratory:  Negative for cough, shortness of breath and wheezing.    Cardiovascular:  Negative for chest pain, palpitations and leg swelling.   Gastrointestinal:  Negative for abdominal pain, change in bowel habit, nausea, vomiting and change in bowel habit.   Integumentary:  Negative for rash.   Neurological:  Negative for dizziness, syncope, weakness, light-headedness, numbness and headaches.   Psychiatric/Behavioral:  Negative for confusion.       Medical / Social / Family History     Past Medical History:   Diagnosis Date    Hypothyroidism     Schizophrenia, unspecified        History reviewed. No pertinent surgical history.    Social History    reports that he has been smoking cigarettes. He has been smoking an average of 1 pack per day. He has never been  exposed to tobacco smoke. He has never used smokeless tobacco. He reports current alcohol use of about 2.0 standard drinks per week. He reports current drug use. Frequency: 3.00 times per week. Drug: Marijuana.   Social History     Tobacco Use    Smoking status: Every Day     Packs/day: 1.00     Types: Cigarettes     Passive exposure: Never    Smokeless tobacco: Never   Substance Use Topics    Alcohol use: Yes     Alcohol/week: 2.0 standard drinks     Types: 2 Cans of beer per week    Drug use: Yes     Frequency: 3.0 times per week     Types: Marijuana       Family History  History reviewed. No pertinent family history.    Medications and Allergies     Medications  Outpatient Medications Marked as Taking for the 5/2/23 encounter (Office Visit) with Danny Gomez MD   Medication Sig Dispense Refill    BANOPHEN 25 mg capsule       benztropine (COGENTIN) 2 MG Tab       cycloSPORINE (RESTASIS) 0.05 % ophthalmic emulsion Place 1 drop into the right eye 2 (two) times daily. 60 each 11    divalproex (DEPAKOTE) 500 MG TbEC       haloperidoL (HALDOL) 5 MG tablet       haloperidol decanoate (HALDOL DECANOATE) 100 mg/mL injection       traZODone (DESYREL) 50 MG tablet       [DISCONTINUED] levothyroxine (SYNTHROID) 50 MCG tablet Take 1 tablet (50 mcg total) by mouth before breakfast. 90 tablet 1       Allergies  Review of patient's allergies indicates:  No Known Allergies    Physical Examination     Vitals:    05/02/23 1125   BP: 137/85   Pulse: 73     Physical Exam  Vitals reviewed.   Constitutional:       Appearance: Normal appearance.   HENT:      Head: Normocephalic and atraumatic.      Right Ear: Tympanic membrane and ear canal normal.      Ears:      Comments: White colored FB seen deep in left canal  Eyes:      Extraocular Movements: Extraocular movements intact.      Conjunctiva/sclera: Conjunctivae normal.      Pupils: Pupils are equal, round, and reactive to light.   Cardiovascular:      Rate and Rhythm: Normal  rate and regular rhythm.      Heart sounds: Normal heart sounds.   Pulmonary:      Effort: Pulmonary effort is normal.      Breath sounds: Normal breath sounds.   Musculoskeletal:         General: Normal range of motion.      Cervical back: Normal range of motion and neck supple.   Skin:     General: Skin is warm and dry.   Neurological:      General: No focal deficit present.      Mental Status: He is alert and oriented to person, place, and time.   Psychiatric:         Mood and Affect: Mood normal.         Behavior: Behavior normal.      Component Ref Range & Units 5 mo ago 2 yr ago 3 yr ago   TSH 0.358 - 3.740 uIU/mL 2.580  2.500 CM  4.920 High  CM    Resulting Agency  [facility]              Specimen Collected: 11/09/22 09:05 Last Resulted: 11/09/22 19:38             Assessment and Plan (including Health Maintenance)      Problem List  Smart Sets  Document Outside HM   :    Plan: thyroid medication refilled.  Alligator forceps were used to retrieve cotton in left ear canal which appeared to be the tip of a q-tip.  TM was visualized and wnl.  There was some mild erythema of distal canal.  I instructed patient to not stick anything in ear canal other than a curette and to only do so if absolutely necessary.        Health Maintenance Due   Topic Date Due    COVID-19 Vaccine (1) Never done    Pneumococcal Vaccines (Age 0-64) (1 - PCV) Never done    HIV Screening  Never done    TETANUS VACCINE  Never done    Hemoglobin A1c (Diabetic Prevention Screening)  Never done       Problem List Items Addressed This Visit          Endocrine    Hypothyroidism - Primary (Chronic)    Relevant Medications    levothyroxine (SYNTHROID) 50 MCG tablet     Other Visit Diagnoses       Foreign body of left ear, initial encounter                Health Maintenance Topics with due status: Not Due       Topic Last Completion Date    Lipid Panel 11/09/2022     Influenza Vaccine Not Due       No future appointments.         Signature:  MD GRAYSON Varner [facility]  MEDICAL GROUP [address] [address]  [phone]    Date of encounter: 5/2/23

## 2023-05-05 ENCOUNTER — HOSPITAL ENCOUNTER (EMERGENCY)
Facility: HOSPITAL | Age: 43
Discharge: HOME | End: 2023-05-05
Payer: MEDICAID

## 2023-05-05 VITALS
HEART RATE: 77 BPM | TEMPERATURE: 98 F | DIASTOLIC BLOOD PRESSURE: 72 MMHG | WEIGHT: 186 LBS | BODY MASS INDEX: 25.19 KG/M2 | SYSTOLIC BLOOD PRESSURE: 142 MMHG | RESPIRATION RATE: 16 BRPM | OXYGEN SATURATION: 97 % | HEIGHT: 72 IN

## 2023-05-05 DIAGNOSIS — T16.2XXA FOREIGN BODY OF LEFT EAR, INITIAL ENCOUNTER: Primary | ICD-10-CM

## 2023-05-05 PROCEDURE — 99283 PR EMERGENCY DEPT VISIT,LEVEL III: ICD-10-PCS | Mod: 25,,, | Performed by: NURSE PRACTITIONER

## 2023-05-05 PROCEDURE — 99283 EMERGENCY DEPT VISIT LOW MDM: CPT | Mod: 25,,, | Performed by: NURSE PRACTITIONER

## 2023-05-05 PROCEDURE — 69200 CLEAR OUTER EAR CANAL: CPT | Mod: LT,,, | Performed by: NURSE PRACTITIONER

## 2023-05-05 PROCEDURE — 99282 EMERGENCY DEPT VISIT SF MDM: CPT

## 2023-05-05 PROCEDURE — 69200 PR REMV EXT CANAL FOREIGN BODY: ICD-10-PCS | Mod: LT,,, | Performed by: NURSE PRACTITIONER

## 2023-05-05 NOTE — ED PROVIDER NOTES
Encounter Date: 5/5/2023       History     Chief Complaint   Patient presents with    Foreign Body in Ear     Patient reports alleged cotton swab stuck in ear     Presented with c/o qtip stuck in left ear for 2 days. Reports was cleaning ear canal with the qtip when the head came off of it. Denies pain, fever or chills, dizziness or n/v.    Review of patient's allergies indicates:  No Known Allergies  Past Medical History:   Diagnosis Date    Hypothyroidism     Schizophrenia, unspecified      History reviewed. No pertinent surgical history.  History reviewed. No pertinent family history.  Social History     Tobacco Use    Smoking status: Every Day     Packs/day: 1.00     Types: Cigarettes     Passive exposure: Never    Smokeless tobacco: Never   Substance Use Topics    Alcohol use: Yes     Alcohol/week: 2.0 standard drinks     Types: 2 Cans of beer per week    Drug use: Yes     Frequency: 3.0 times per week     Types: Marijuana     Review of Systems   HENT:          Foreign body left ear   All other systems reviewed and are negative.    Physical Exam     Initial Vitals [05/05/23 0852]   BP Pulse Resp Temp SpO2   (!) 142/72 77 16 98 °F (36.7 °C) 97 %      MAP       --         Physical Exam    Nursing note and vitals reviewed.  Constitutional: He appears well-developed and well-nourished.   HENT:   Head: Normocephalic.   Right Ear: External ear normal. No tenderness. Tympanic membrane is scarred. Tympanic membrane is not perforated and not erythematous.   Left Ear: External ear normal. No tenderness. A foreign body is present. Tympanic membrane is scarred. Tympanic membrane is not perforated and not erythematous.   Nose: Nose normal.   Mouth/Throat: Oropharynx is clear and moist.   Eyes: Conjunctivae and EOM are normal. Pupils are equal, round, and reactive to light.   Neck: Neck supple.   Normal range of motion.  Cardiovascular:  Normal rate, regular rhythm, normal heart sounds and intact distal pulses.            Pulmonary/Chest: Breath sounds normal.   Abdominal: Abdomen is soft. Bowel sounds are normal.   Musculoskeletal:         General: Normal range of motion.      Cervical back: Normal range of motion and neck supple.     Neurological: He is alert and oriented to person, place, and time. He has normal strength. GCS score is 15. GCS eye subscore is 4. GCS verbal subscore is 5. GCS motor subscore is 6.   Skin: Skin is warm and dry. Capillary refill takes less than 2 seconds.   Psychiatric: He has a normal mood and affect.       Medical Screening Exam   See Full Note    ED Course   Procedures  Labs Reviewed - No data to display       Imaging Results    None          Medications - No data to display  Medical Decision Making:   ED Management:  Presented with c/o qtip stuck in left ear for 2 days. Reports was cleaning ear canal with the qtip when the head came off of it. Denies pain, fever or chills, dizziness or n/v.    Explained procedure for FB removal. Head of qtip removed using alligator forceps. Pt tano well.    Discharged home with detailed home care instructions. Reminded not to use Qtip for ear cleaning.                       Clinical Impression:   Final diagnoses:  [T16.2XXA] Foreign body of left ear, initial encounter (Primary)        ED Disposition Condition    Discharge Stable          ED Prescriptions    None       Follow-up Information    None          Tammie Estrada NP  05/05/23 4076

## 2023-05-05 NOTE — DISCHARGE INSTRUCTIONS
Do not stick qtips in your ear. Follow-up with your PCP on Monday for recheck. Return to the ED for new symptoms.

## 2023-06-13 ENCOUNTER — OFFICE VISIT (OUTPATIENT)
Dept: FAMILY MEDICINE | Facility: CLINIC | Age: 43
End: 2023-06-13
Payer: MEDICAID

## 2023-06-13 VITALS
SYSTOLIC BLOOD PRESSURE: 123 MMHG | WEIGHT: 211 LBS | HEART RATE: 91 BPM | DIASTOLIC BLOOD PRESSURE: 82 MMHG | HEIGHT: 72 IN | BODY MASS INDEX: 28.58 KG/M2

## 2023-06-13 DIAGNOSIS — F20.9 SCHIZOPHRENIA, UNSPECIFIED TYPE: Primary | Chronic | ICD-10-CM

## 2023-06-13 PROCEDURE — 3074F PR MOST RECENT SYSTOLIC BLOOD PRESSURE < 130 MM HG: ICD-10-PCS | Mod: CPTII,,, | Performed by: FAMILY MEDICINE

## 2023-06-13 PROCEDURE — 1160F RVW MEDS BY RX/DR IN RCRD: CPT | Mod: CPTII,,, | Performed by: FAMILY MEDICINE

## 2023-06-13 PROCEDURE — 99212 PR OFFICE/OUTPT VISIT, EST, LEVL II, 10-19 MIN: ICD-10-PCS | Mod: ,,, | Performed by: FAMILY MEDICINE

## 2023-06-13 PROCEDURE — 1159F MED LIST DOCD IN RCRD: CPT | Mod: CPTII,,, | Performed by: FAMILY MEDICINE

## 2023-06-13 PROCEDURE — 99212 OFFICE O/P EST SF 10 MIN: CPT | Mod: ,,, | Performed by: FAMILY MEDICINE

## 2023-06-13 PROCEDURE — 3074F SYST BP LT 130 MM HG: CPT | Mod: CPTII,,, | Performed by: FAMILY MEDICINE

## 2023-06-13 PROCEDURE — 1159F PR MEDICATION LIST DOCUMENTED IN MEDICAL RECORD: ICD-10-PCS | Mod: CPTII,,, | Performed by: FAMILY MEDICINE

## 2023-06-13 PROCEDURE — 1160F PR REVIEW ALL MEDS BY PRESCRIBER/CLIN PHARMACIST DOCUMENTED: ICD-10-PCS | Mod: CPTII,,, | Performed by: FAMILY MEDICINE

## 2023-06-13 PROCEDURE — 3008F PR BODY MASS INDEX (BMI) DOCUMENTED: ICD-10-PCS | Mod: CPTII,,, | Performed by: FAMILY MEDICINE

## 2023-06-13 PROCEDURE — 3079F PR MOST RECENT DIASTOLIC BLOOD PRESSURE 80-89 MM HG: ICD-10-PCS | Mod: CPTII,,, | Performed by: FAMILY MEDICINE

## 2023-06-13 PROCEDURE — 3079F DIAST BP 80-89 MM HG: CPT | Mod: CPTII,,, | Performed by: FAMILY MEDICINE

## 2023-06-13 PROCEDURE — 3008F BODY MASS INDEX DOCD: CPT | Mod: CPTII,,, | Performed by: FAMILY MEDICINE

## 2023-06-13 NOTE — PROGRESS NOTES
"   Danny Gomez MD   [facility]  MEDICAL GROUP [address] [address]  [phone]      PATIENT NAME: Oli Lee  : 1980  DATE: 23  MRN: [id number]      Billing Provider: Danny Gomez MD  Level of Service: [place] OFFICE/OUTPT VISIT, EST, LEVL II, 10-19 MIN  COMMITMENT EVALUATION  Patient PCP Information       Provider PCP Type    Danny Gomez MD General            Reason for Visit / Chief Complaint: evaluation for committment       Update PCP  Update Chief Complaint         History of Present Illness / Problem Focused Workflow     Oli Lee presents to the clinic with evaluation for committment       44 yo AAM with schizophrenia.  He is non-compliant with his medication.  Mother is present today and provides history.  She reports that Oli has not been taking ghis medication recently and has been having AVH and delusional thoughts.  He has not specifically threatened anyone with violence but says that he has been getting "more irritable."  Was reportedly recently evaluated at [place].    Review of Systems     Review of Systems   Psychiatric/Behavioral:  Positive for agitation, confusion and hallucinations.       Medical / Social / Family History     Past Medical History:   Diagnosis Date    Hypothyroidism     Schizophrenia, unspecified        History reviewed. No pertinent surgical history.    Social History    reports that he has been smoking cigarettes. He has been smoking an average of 1 pack per day. He has never been exposed to tobacco smoke. He has never used smokeless tobacco. He reports current alcohol use of about 2.0 standard drinks per week. He reports current drug use. Frequency: 3.00 times per week. Drug: Marijuana.   Social History     Tobacco Use    Smoking status: Every Day     Packs/day: 1.00     Types: Cigarettes     Passive exposure: Never    Smokeless tobacco: Never "   Substance Use Topics    Alcohol use: Yes     Alcohol/week: 2.0 standard drinks     Types: 2 Cans of beer per week    Drug use: Yes     Frequency: 3.0 times per week     Types: Marijuana       Family History  History reviewed. No pertinent family history.    Medications and Allergies     Medications  No outpatient medications have been marked as taking for the 6/13/23 encounter (Office Visit) with Danny Gomez MD.       Allergies  Review of patient's allergies indicates:  No Known Allergies    Physical Examination     Vitals:    06/13/23 1048   BP: 123/82   Pulse: 91     Physical Exam  Constitutional:       Appearance: Normal appearance.   HENT:      Head: Normocephalic and atraumatic.   Eyes:      Conjunctiva/sclera: Conjunctivae normal.      Pupils: Pupils are equal, round, and reactive to light.   Cardiovascular:      Rate and Rhythm: Normal rate and regular rhythm.   Pulmonary:      Effort: Pulmonary effort is normal.      Breath sounds: Normal breath sounds.   Musculoskeletal:      Cervical back: Normal range of motion.   Skin:     General: Skin is warm and dry.   Neurological:      General: No focal deficit present.      Mental Status: He is alert. Mental status is at baseline.   Psychiatric:         Mood and Affect: Mood normal.      Comments: +[place]  Speech c RRV  Blunted affect        Assessment and Plan (including Health Maintenance)      Problem List  Smart Sets  Document Outside HM   :    Plan: agree with commitment- see scanned document        Health Maintenance Due   Topic Date Due    COVID-19 Vaccine (1) Never done    Pneumococcal Vaccines (Age 0-64) (1 - PCV) Never done    HIV Screening  Never done    TETANUS VACCINE  Never done    Hemoglobin A1c (Diabetic Prevention Screening)  Never done       Problem List Items Addressed This Visit    None  Visit Diagnoses       Schizophrenia, unspecified type  (Chronic)   -  Primary            Health Maintenance Topics with due status: Not Due       Topic  Last Completion Date    Lipid Panel 11/09/2022    Influenza Vaccine Not Due       No future appointments.         Signature:  MD GRAYSON Varner [facility]  MEDICAL GROUP [facility]  [address] [place] [address] [place] [address]  [phone]    Date of encounter: 6/13/23

## 2023-08-25 DIAGNOSIS — E03.9 ACQUIRED HYPOTHYROIDISM: Chronic | ICD-10-CM

## 2023-08-25 RX ORDER — LEVOTHYROXINE SODIUM 50 UG/1
50 TABLET ORAL
Qty: 90 TABLET | Refills: 0 | Status: SHIPPED | OUTPATIENT
Start: 2023-08-25 | End: 2023-09-18 | Stop reason: SDUPTHER

## 2023-08-29 DIAGNOSIS — H04.121 DRY EYE OF RIGHT SIDE: Chronic | ICD-10-CM

## 2023-08-30 RX ORDER — CYCLOSPORINE 0.5 MG/ML
1 EMULSION OPHTHALMIC 2 TIMES DAILY
Qty: 60 EACH | Refills: 11 | Status: SHIPPED | OUTPATIENT
Start: 2023-08-30 | End: 2023-12-07 | Stop reason: SDUPTHER

## 2023-09-18 DIAGNOSIS — E03.9 ACQUIRED HYPOTHYROIDISM: Chronic | ICD-10-CM

## 2023-09-19 RX ORDER — LEVOTHYROXINE SODIUM 50 UG/1
50 TABLET ORAL
Qty: 90 TABLET | Refills: 0 | Status: SHIPPED | OUTPATIENT
Start: 2023-09-19 | End: 2023-11-30 | Stop reason: SDUPTHER

## 2023-09-20 ENCOUNTER — OFFICE VISIT (OUTPATIENT)
Dept: FAMILY MEDICINE | Facility: CLINIC | Age: 43
End: 2023-09-20
Payer: MEDICAID

## 2023-09-20 VITALS
HEIGHT: 72 IN | WEIGHT: 231.69 LBS | DIASTOLIC BLOOD PRESSURE: 82 MMHG | SYSTOLIC BLOOD PRESSURE: 126 MMHG | BODY MASS INDEX: 31.38 KG/M2 | HEART RATE: 91 BPM

## 2023-09-20 DIAGNOSIS — Z00.00 ROUTINE GENERAL MEDICAL EXAMINATION AT A HEALTH CARE FACILITY: Primary | ICD-10-CM

## 2023-09-20 DIAGNOSIS — Z13.1 SCREENING FOR DIABETES MELLITUS: ICD-10-CM

## 2023-09-20 DIAGNOSIS — F17.200 TOBACCO DEPENDENCE SYNDROME: ICD-10-CM

## 2023-09-20 PROCEDURE — 3008F PR BODY MASS INDEX (BMI) DOCUMENTED: ICD-10-PCS | Mod: CPTII,,, | Performed by: FAMILY MEDICINE

## 2023-09-20 PROCEDURE — 83036 HEMOGLOBIN GLYCOSYLATED A1C: CPT | Mod: ,,, | Performed by: CLINICAL MEDICAL LABORATORY

## 2023-09-20 PROCEDURE — 1160F RVW MEDS BY RX/DR IN RCRD: CPT | Mod: CPTII,,, | Performed by: FAMILY MEDICINE

## 2023-09-20 PROCEDURE — 99396 PR PREVENTIVE VISIT,EST,40-64: ICD-10-PCS | Mod: 25,,, | Performed by: FAMILY MEDICINE

## 2023-09-20 PROCEDURE — 3079F PR MOST RECENT DIASTOLIC BLOOD PRESSURE 80-89 MM HG: ICD-10-PCS | Mod: CPTII,,, | Performed by: FAMILY MEDICINE

## 2023-09-20 PROCEDURE — 3074F PR MOST RECENT SYSTOLIC BLOOD PRESSURE < 130 MM HG: ICD-10-PCS | Mod: CPTII,,, | Performed by: FAMILY MEDICINE

## 2023-09-20 PROCEDURE — 1159F MED LIST DOCD IN RCRD: CPT | Mod: CPTII,,, | Performed by: FAMILY MEDICINE

## 2023-09-20 PROCEDURE — 83036 HEMOGLOBIN A1C: ICD-10-PCS | Mod: ,,, | Performed by: CLINICAL MEDICAL LABORATORY

## 2023-09-20 PROCEDURE — 99406 BEHAV CHNG SMOKING 3-10 MIN: CPT | Mod: ,,, | Performed by: FAMILY MEDICINE

## 2023-09-20 PROCEDURE — 1160F PR REVIEW ALL MEDS BY PRESCRIBER/CLIN PHARMACIST DOCUMENTED: ICD-10-PCS | Mod: CPTII,,, | Performed by: FAMILY MEDICINE

## 2023-09-20 PROCEDURE — 3074F SYST BP LT 130 MM HG: CPT | Mod: CPTII,,, | Performed by: FAMILY MEDICINE

## 2023-09-20 PROCEDURE — 99396 PREV VISIT EST AGE 40-64: CPT | Mod: 25,,, | Performed by: FAMILY MEDICINE

## 2023-09-20 PROCEDURE — 99406 PR TOBACCO USE CESSATION INTERMEDIATE 3-10 MINUTES: ICD-10-PCS | Mod: ,,, | Performed by: FAMILY MEDICINE

## 2023-09-20 PROCEDURE — 1159F PR MEDICATION LIST DOCUMENTED IN MEDICAL RECORD: ICD-10-PCS | Mod: CPTII,,, | Performed by: FAMILY MEDICINE

## 2023-09-20 PROCEDURE — 3008F BODY MASS INDEX DOCD: CPT | Mod: CPTII,,, | Performed by: FAMILY MEDICINE

## 2023-09-20 PROCEDURE — 3079F DIAST BP 80-89 MM HG: CPT | Mod: CPTII,,, | Performed by: FAMILY MEDICINE

## 2023-09-20 RX ORDER — ACETAMINOPHEN, DIPHENHYDRAMINE HCL, PHENYLEPHRINE HCL 325; 25; 5 MG/1; MG/1; MG/1
1 TABLET ORAL NIGHTLY PRN
COMMUNITY
Start: 2023-08-21

## 2023-09-20 RX ORDER — HALOPERIDOL 10 MG/1
1 TABLET ORAL DAILY
COMMUNITY
Start: 2023-09-13

## 2023-09-20 RX ORDER — DIVALPROEX SODIUM 250 MG/1
250 TABLET, DELAYED RELEASE ORAL DAILY
COMMUNITY
Start: 2023-09-13

## 2023-09-20 RX ORDER — BENZTROPINE MESYLATE 1 MG/1
1 TABLET ORAL 2 TIMES DAILY
COMMUNITY
Start: 2023-09-13

## 2023-09-20 NOTE — PROGRESS NOTES
Danny Gomez MD   [facility]  MEDICAL GROUP [address] [address]  [phone]      PATIENT NAME: Oli Lee  : 1980  DATE: 23  MRN: [id number]      Billing Provider: Danny Gomez MD  Level of Service: [place] PREVENTIVE VISIT,EST,40-64  Patient PCP Information       Provider PCP Type    Danny Gomez MD General            Reason for Visit / Chief Complaint: Health Maintenance       Update PCP  Update Chief Complaint         History of Present Illness / Problem Focused Workflow     Oli Lee presents to the clinic with Health Maintenance       44 yo AAM here today for a wellness visit.  He reports that he is compliant with his medications.  He does smoke about 5 cigarettes per day.  I have counseled him on smoking and he is in the process of cutting down to stop.  Previously smoked marijuana but no longer does.  Does not drink alcohol or use any other illicit drugs.  He attends [employer].        Review of Systems     Review of Systems   Constitutional:  Negative for chills, fatigue and fever.   HENT:  Negative for sinus pressure/congestion, sore throat and trouble swallowing.    Respiratory:  Negative for cough, shortness of breath and wheezing.    Cardiovascular:  Negative for chest pain, palpitations and leg swelling.   Gastrointestinal:  Negative for abdominal pain, change in bowel habit, nausea and vomiting.   Integumentary:  Negative for rash.   Neurological:  Negative for dizziness, syncope, weakness, light-headedness, numbness and headaches.        Medical / Social / Family History     Past Medical History:   Diagnosis Date    Hypothyroidism     Schizophrenia, unspecified        History reviewed. No pertinent surgical history.    Social History    reports that he has been smoking cigarettes. He has never been exposed to tobacco smoke. He has never used smokeless tobacco. He reports current  alcohol use of about 2.0 standard drinks of alcohol per week. He reports current drug use. Frequency: 3.00 times per week. Drug: Marijuana.   Social History     Tobacco Use    Smoking status: Every Day     Current packs/day: 1.00     Types: Cigarettes     Passive exposure: Never    Smokeless tobacco: Never   Substance Use Topics    Alcohol use: Yes     Alcohol/week: 2.0 standard drinks of alcohol     Types: 2 Cans of beer per week    Drug use: Yes     Frequency: 3.0 times per week     Types: Marijuana       Family History  History reviewed. No pertinent family history.    Medications and Allergies     Medications  Outpatient Medications Marked as Taking for the 9/20/23 encounter (Office Visit) with Danny Gomez MD   Medication Sig Dispense Refill    BANOPHEN 25 mg capsule       benztropine (COGENTIN) 1 MG tablet Take 1 mg by mouth 2 (two) times daily.      cycloSPORINE (RESTASIS) 0.05 % ophthalmic emulsion Place 1 drop into the right eye 2 (two) times daily. 60 each 11    divalproex (DEPAKOTE) 250 MG EC tablet Take 250 mg by mouth once daily.      divalproex (DEPAKOTE) 500 MG TbEC       haloperidoL (HALDOL) 10 MG tablet Take 1 tablet by mouth once daily.      haloperidol decanoate (HALDOL DECANOATE) 100 mg/mL injection       levothyroxine (SYNTHROID) 50 MCG tablet Take 1 tablet (50 mcg total) by mouth before breakfast. 90 tablet 0    melatonin 10 mg Tab Take 1 tablet by mouth nightly as needed.      traZODone (DESYREL) 50 MG tablet       [DISCONTINUED] benztropine (COGENTIN) 2 MG Tab       [DISCONTINUED] haloperidoL (HALDOL) 5 MG tablet          Allergies  Review of patient's allergies indicates:  No Known Allergies    Physical Examination     Vitals:    09/20/23 1329   BP: 126/82   Pulse: 91     Physical Exam  Vitals reviewed.   Constitutional:       Appearance: Normal appearance.   HENT:      Head: Normocephalic and atraumatic.   Eyes:      Conjunctiva/sclera: Conjunctivae normal.   Cardiovascular:      Rate  and Rhythm: Normal rate and regular rhythm.      Heart sounds: Normal heart sounds.   Pulmonary:      Effort: Pulmonary effort is normal.      Breath sounds: Normal breath sounds.   Musculoskeletal:         General: Normal range of motion.      Cervical back: Normal range of motion and neck supple.   Skin:     General: Skin is warm and dry.   Neurological:      General: No focal deficit present.      Mental Status: He is alert and oriented to person, place, and time.   Psychiatric:         Mood and Affect: Mood normal.         Behavior: Behavior normal.        Lab Visit on 11/09/2022   Component Date Value Ref Range Status    Sodium 11/09/2022 138  136 - 145 mmol/L Final    Potassium 11/09/2022 3.8  3.5 - 5.1 mmol/L Final    Chloride 11/09/2022 107  98 - 107 mmol/L Final    CO2 11/09/2022 26  21 - 32 mmol/L Final    Anion Gap 11/09/2022 9  7 - 16 mmol/L Final    Glucose 11/09/2022 99  74 - 106 mg/dL Final    BUN 11/09/2022 11  7 - 18 mg/dL Final    Creatinine 11/09/2022 1.15  0.70 - 1.30 mg/dL Final    BUN/Creatinine Ratio 11/09/2022 10  6 - 20 Final    Calcium 11/09/2022 9.4  8.5 - 10.1 mg/dL Final    Total Protein 11/09/2022 7.7  6.4 - 8.2 g/dL Final    Albumin 11/09/2022 4.1  3.5 - 5.0 g/dL Final    Globulin 11/09/2022 3.6  2.0 - 4.0 g/dL Final    A/G Ratio 11/09/2022 1.1   Final    Bilirubin, Total 11/09/2022 0.3  >0.0 - 1.2 mg/dL Final    Alk Phos 11/09/2022 58  45 - 115 U/L Final    ALT 11/09/2022 36  16 - 61 U/L Final    AST 11/09/2022 21  15 - 37 U/L Final    eGFR 11/09/2022 81  >=60 mL/min/1.73m² Final    Triglycerides 11/09/2022 118  35 - 150 mg/dL Final      Normal:  <150 mg/dL  Borderline High: 150-199 mg/dL  High:   200-499 mg/dL  Very High:  >=500    Cholesterol 11/09/2022 135  0 - 200 mg/dL Final      <200 mg/dL:  Desirable  200-240 mg/dL: Borderline High  >240 mg/dL:  High    HDL Cholesterol 11/09/2022 46  40 - 60 mg/dL Final      <40 mg/dL: Low HDL  40-60 mg/dL: Normal  >60 mg/dL: Desirable     Cholesterol/HDL Ratio (Risk Factor) 11/09/2022 2.9   Final    Non-HDL 11/09/2022 89  mg/dL Final    LDL Calculated 11/09/2022 65  mg/dL Final    Unable to calculate due to one of the following values:  Cholesterol <5  HDL Cholesterol <5  Triglycerides <10 or >400    LDL/HDL 11/09/2022 1.4   Final    Unable to calculate due to one of the following values:  Cholesterol <5  HDL Cholesterol <5  Triglycerides <10 or >400    VLDL 11/09/2022 24  mg/dL Final    Hepatitis C Ab 11/09/2022 Non-Reactive  Non-Reactive Final    TSH 11/09/2022 2.580  0.358 - 3.740 uIU/mL Final    WBC 11/09/2022 10.39  4.50 - 11.00 K/uL Final    RBC 11/09/2022 4.83  4.60 - 6.20 M/uL Final    Hemoglobin 11/09/2022 15.4  13.5 - 18.0 g/dL Final    Hematocrit 11/09/2022 47.7  40.0 - 54.0 % Final    MCV 11/09/2022 98.8 (H)  80.0 - 96.0 fL Final    MCH 11/09/2022 31.9 (H)  27.0 - 31.0 pg Final    MCHC 11/09/2022 32.3  32.0 - 36.0 g/dL Final    RDW 11/09/2022 12.1  11.5 - 14.5 % Final    Platelet Count 11/09/2022 300  150 - 400 K/uL Final    MPV 11/09/2022 10.8  9.4 - 12.4 fL Final    Neutrophils % 11/09/2022 71.0 (H)  53.0 - 65.0 % Final    Lymphocytes % 11/09/2022 22.4 (L)  27.0 - 41.0 % Final    Monocytes % 11/09/2022 4.7  2.0 - 6.0 % Final    Eosinophils % 11/09/2022 1.3  1.0 - 4.0 % Final    Basophils % 11/09/2022 0.4  0.0 - 1.0 % Final    Immature Granulocytes % 11/09/2022 0.2  0.0 - 0.4 % Final    nRBC, Auto 11/09/2022 0.0  <=0.0 % Final    Neutrophils, Abs 11/09/2022 7.37  1.80 - 7.70 K/uL Final    Lymphocytes, Absolute 11/09/2022 2.33  1.00 - 4.80 K/uL Final    Monocytes, Absolute 11/09/2022 0.49  0.00 - 0.80 K/uL Final    Eosinophils, Absolute 11/09/2022 0.14  0.00 - 0.50 K/uL Final    Basophils, Absolute 11/09/2022 0.04  0.00 - 0.20 K/uL Final    Immature Granulocytes, Absolute 11/09/2022 0.02  0.00 - 0.04 K/uL Final    nRBC, Absolute 11/09/2022 0.00  <=0.00 x10e3/uL Final    Diff Type 11/09/2022 Auto   Final    Valproic Acid (Depakene)  11/09/2022 <3 (L)  50 - 100 µg/mL Final         Assessment and Plan (including Health Maintenance)      Problem List  Smart Sets  Document Outside HM   :    Plan:         Health Maintenance Due   Topic Date Due    COVID-19 Vaccine (1) Never done    Pneumococcal Vaccines (Age 0-64) (1 - PCV) Never done    HIV Screening  Never done    TETANUS VACCINE  Never done    Hemoglobin A1c (Diabetic Prevention Screening)  Never done    Influenza Vaccine (1) Never done       Problem List Items Addressed This Visit    None  Visit Diagnoses       Routine general medical examination at a health care facility    -  Primary    Screening for diabetes mellitus        Relevant Orders    Hemoglobin A1C    Tobacco dependence syndrome        Relevant Orders    [64193] [place] TOBACCO USE CESSATION INTERMEDIATE 3-10 MIN            Health Maintenance Topics with due status: Not Due       Topic Last Completion Date    Lipid Panel 11/09/2022       No future appointments.         Signature:  MD GRAYSON Varner [facility]  MEDICAL GROUP OF [place] [facility]  [address] [place] [id number]  [phone]    Date of encounter: 9/20/23

## 2023-09-20 NOTE — PROGRESS NOTES
Tobacco Use/Cessation:  I assessed Oli Lee and discussed smoking cessation with him for 3-10 minutes. He reports that he has been smoking cigarettes. He has never been exposed to tobacco smoke. He has never used smokeless tobacco.

## 2023-09-21 LAB
EST. AVERAGE GLUCOSE BLD GHB EST-MCNC: 74 MG/DL
HBA1C MFR BLD HPLC: 4.8 % (ref 4.5–6.6)

## 2023-11-30 DIAGNOSIS — E03.9 ACQUIRED HYPOTHYROIDISM: Chronic | ICD-10-CM

## 2023-11-30 RX ORDER — LEVOTHYROXINE SODIUM 50 UG/1
50 TABLET ORAL
Qty: 90 TABLET | Refills: 0 | Status: SHIPPED | OUTPATIENT
Start: 2023-11-30 | End: 2024-02-28

## 2023-12-07 ENCOUNTER — OFFICE VISIT (OUTPATIENT)
Dept: FAMILY MEDICINE | Facility: CLINIC | Age: 43
End: 2023-12-07
Payer: MEDICAID

## 2023-12-07 VITALS
HEART RATE: 76 BPM | SYSTOLIC BLOOD PRESSURE: 126 MMHG | DIASTOLIC BLOOD PRESSURE: 76 MMHG | BODY MASS INDEX: 29.69 KG/M2 | WEIGHT: 219.19 LBS | HEIGHT: 72 IN

## 2023-12-07 DIAGNOSIS — H04.121 DRY EYE OF RIGHT SIDE: Primary | Chronic | ICD-10-CM

## 2023-12-07 DIAGNOSIS — L30.9 DERMATITIS: ICD-10-CM

## 2023-12-07 DIAGNOSIS — F20.9 SCHIZOPHRENIA, UNSPECIFIED TYPE: Chronic | ICD-10-CM

## 2023-12-07 PROCEDURE — 99214 PR OFFICE/OUTPT VISIT, EST, LEVL IV, 30-39 MIN: ICD-10-PCS | Mod: ,,, | Performed by: FAMILY MEDICINE

## 2023-12-07 PROCEDURE — 3074F SYST BP LT 130 MM HG: CPT | Mod: CPTII,,, | Performed by: FAMILY MEDICINE

## 2023-12-07 PROCEDURE — 1159F PR MEDICATION LIST DOCUMENTED IN MEDICAL RECORD: ICD-10-PCS | Mod: CPTII,,, | Performed by: FAMILY MEDICINE

## 2023-12-07 PROCEDURE — 1159F MED LIST DOCD IN RCRD: CPT | Mod: CPTII,,, | Performed by: FAMILY MEDICINE

## 2023-12-07 PROCEDURE — 3074F PR MOST RECENT SYSTOLIC BLOOD PRESSURE < 130 MM HG: ICD-10-PCS | Mod: CPTII,,, | Performed by: FAMILY MEDICINE

## 2023-12-07 PROCEDURE — 3044F HG A1C LEVEL LT 7.0%: CPT | Mod: CPTII,,, | Performed by: FAMILY MEDICINE

## 2023-12-07 PROCEDURE — 3008F PR BODY MASS INDEX (BMI) DOCUMENTED: ICD-10-PCS | Mod: CPTII,,, | Performed by: FAMILY MEDICINE

## 2023-12-07 PROCEDURE — 3044F PR MOST RECENT HEMOGLOBIN A1C LEVEL <7.0%: ICD-10-PCS | Mod: CPTII,,, | Performed by: FAMILY MEDICINE

## 2023-12-07 PROCEDURE — 3008F BODY MASS INDEX DOCD: CPT | Mod: CPTII,,, | Performed by: FAMILY MEDICINE

## 2023-12-07 PROCEDURE — 1160F PR REVIEW ALL MEDS BY PRESCRIBER/CLIN PHARMACIST DOCUMENTED: ICD-10-PCS | Mod: CPTII,,, | Performed by: FAMILY MEDICINE

## 2023-12-07 PROCEDURE — 1160F RVW MEDS BY RX/DR IN RCRD: CPT | Mod: CPTII,,, | Performed by: FAMILY MEDICINE

## 2023-12-07 PROCEDURE — 3078F DIAST BP <80 MM HG: CPT | Mod: CPTII,,, | Performed by: FAMILY MEDICINE

## 2023-12-07 PROCEDURE — 3078F PR MOST RECENT DIASTOLIC BLOOD PRESSURE < 80 MM HG: ICD-10-PCS | Mod: CPTII,,, | Performed by: FAMILY MEDICINE

## 2023-12-07 PROCEDURE — 99214 OFFICE O/P EST MOD 30 MIN: CPT | Mod: ,,, | Performed by: FAMILY MEDICINE

## 2023-12-07 RX ORDER — CLOTRIMAZOLE AND BETAMETHASONE DIPROPIONATE 10; .64 MG/G; MG/G
CREAM TOPICAL 2 TIMES DAILY
Qty: 15 G | Refills: 1 | Status: SHIPPED | OUTPATIENT
Start: 2023-12-07

## 2023-12-07 RX ORDER — CYCLOSPORINE 0.5 MG/ML
1 EMULSION OPHTHALMIC 2 TIMES DAILY
Qty: 60 EACH | Refills: 11 | Status: SHIPPED | OUTPATIENT
Start: 2023-12-07 | End: 2024-12-06

## 2023-12-07 NOTE — PROGRESS NOTES
Danny Gomez MD   [facility]  [facility] [address] [address]  [phone]      PATIENT NAME: Oli Lee  : 1980  DATE: 23  MRN: [id number]      Billing Provider: Danny Gomez MD  Level of Service:   Patient PCP Information       Provider PCP Type    Danny Gomez MD General            Reason for Visit / Chief Complaint: Medication Refill and rash between fingers right hand (Itches/X 1 week)       Update PCP  Update Chief Complaint         History of Present Illness / Problem Focused Workflow     Oli Lee presents to the clinic with Medication Refill and rash between fingers right hand (Itches/X 1 week)       Sees Dr. Glass for eye exams.  Is out of drops for dry eye and needs refill.  Sees Maggie for management of schizophrenia.  One of his meds is benztropine which can cause or exacerbate dry eyes due to being anticholinergic.  Rash has been present x 1 week on hand and foot.      Review of Systems     Review of Systems   Constitutional:  Negative for chills, fatigue and fever.   HENT:  Negative for sinus pressure/congestion, sore throat and trouble swallowing.    Eyes:  Positive for eye dryness.   Respiratory:  Negative for cough, shortness of breath and wheezing.    Cardiovascular:  Negative for chest pain, palpitations and leg swelling.   Gastrointestinal:  Negative for abdominal pain, change in bowel habit, nausea and vomiting.   Integumentary:  Positive for rash.   Neurological:  Negative for dizziness, syncope, weakness, light-headedness, numbness and headaches.   Psychiatric/Behavioral:  Negative for agitation and hallucinations. The patient is not nervous/anxious.         Medical / Social / Family History     Past Medical History:   Diagnosis Date    Hypothyroidism     Schizophrenia, unspecified        History reviewed. No pertinent surgical history.    Social History     reports that he has been smoking cigarettes. He has never been exposed to tobacco smoke. He has never used smokeless tobacco. He reports current alcohol use of about 2.0 standard drinks of alcohol per week. He reports current drug use. Frequency: 3.00 times per week. Drug: Marijuana.   Social History     Tobacco Use    Smoking status: Every Day     Current packs/day: 1.00     Types: Cigarettes     Passive exposure: Never    Smokeless tobacco: Never   Substance Use Topics    Alcohol use: Yes     Alcohol/week: 2.0 standard drinks of alcohol     Types: 2 Cans of beer per week    Drug use: Yes     Frequency: 3.0 times per week     Types: Marijuana       Family History  History reviewed. No pertinent family history.    Medications and Allergies     Medications  No outpatient medications have been marked as taking for the 12/7/23 encounter (Office Visit) with Danny Gomez MD.       Allergies  Review of patient's allergies indicates:  No Known Allergies    Physical Examination     Vitals:    12/07/23 1202   BP: 126/76   Pulse: 76     Physical Exam  Vitals reviewed.   Constitutional:       Appearance: Normal appearance.   HENT:      Head: Normocephalic and atraumatic.   Cardiovascular:      Rate and Rhythm: Normal rate and regular rhythm.   Pulmonary:      Effort: Pulmonary effort is normal.      Breath sounds: Normal breath sounds.   Musculoskeletal:         General: Normal range of motion.   Skin:     General: Skin is warm and dry.   Neurological:      General: No focal deficit present.      Mental Status: He is alert and oriented to person, place, and time.   Psychiatric:         Mood and Affect: Mood normal.         Behavior: Behavior normal.        Assessment and Plan (including Health Maintenance)      Problem List  Smart Sets  Document Outside HM   :    Plan:         Health Maintenance Due   Topic Date Due    COVID-19 Vaccine (1) Never done    Pneumococcal Vaccines (Age 0-64) (1 - PCV) Never done    HIV  Screening  Never done    TETANUS VACCINE  Never done    Influenza Vaccine (1) Never done       Problem List Items Addressed This Visit          Psychiatric    Schizophrenia (Chronic)       Ophtho    Dry eye of right side - Primary (Chronic)    Relevant Medications    cycloSPORINE (RESTASIS) 0.05 % ophthalmic emulsion     Other Visit Diagnoses       Dermatitis        Relevant Medications    clotrimazole-betamethasone 1-0.05% (LOTRISONE) cream            Health Maintenance Topics with due status: Not Due       Topic Last Completion Date    Lipid Panel 11/09/2022    Hemoglobin A1c (Diabetic Prevention Screening) 09/20/2023       No future appointments.         Signature:  MD GRAYSON Varner [facility]  [facility] [place] [facility]  [address] [place] [id number]  [phone]    Date of encounter: 12/7/23

## 2024-01-11 ENCOUNTER — HOSPITAL ENCOUNTER (EMERGENCY)
Facility: HOSPITAL | Age: 44
Discharge: HOME | End: 2024-01-11
Payer: MEDICAID

## 2024-01-11 VITALS
RESPIRATION RATE: 16 BRPM | BODY MASS INDEX: 28.89 KG/M2 | WEIGHT: 218 LBS | DIASTOLIC BLOOD PRESSURE: 92 MMHG | HEIGHT: 73 IN | HEART RATE: 67 BPM | TEMPERATURE: 98 F | SYSTOLIC BLOOD PRESSURE: 145 MMHG | OXYGEN SATURATION: 98 %

## 2024-01-11 DIAGNOSIS — G89.29 CHRONIC NONINTRACTABLE HEADACHE, UNSPECIFIED HEADACHE TYPE: Primary | ICD-10-CM

## 2024-01-11 DIAGNOSIS — R51.9 CHRONIC NONINTRACTABLE HEADACHE, UNSPECIFIED HEADACHE TYPE: Primary | ICD-10-CM

## 2024-01-11 PROCEDURE — 25000003 PHARM REV CODE 250: Performed by: NURSE PRACTITIONER

## 2024-01-11 PROCEDURE — 99283 EMERGENCY DEPT VISIT LOW MDM: CPT

## 2024-01-11 PROCEDURE — 99284 EMERGENCY DEPT VISIT MOD MDM: CPT | Mod: ,,, | Performed by: NURSE PRACTITIONER

## 2024-01-11 RX ORDER — ACETAMINOPHEN 500 MG
1000 TABLET ORAL
Status: COMPLETED | OUTPATIENT
Start: 2024-01-11 | End: 2024-01-11

## 2024-01-11 RX ADMIN — ACETAMINOPHEN 1000 MG: 500 TABLET ORAL at 03:01
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
## 2024-01-11 NOTE — ED NOTES
Blood pressure 130/86  Heart Rate 73  O2 Sat 98% on room air    Called for EMS for a report of a severe headache

## 2024-01-11 NOTE — ED NOTES
"Patient states he has not taken any of his medication since he left [facility] on 01/09/24. He reports that "they have not sent it to me" and "I need to go back up there to get it,"  "

## 2024-01-11 NOTE — ED TRIAGE NOTES
Comes from home by [facility] with a c/o a headache that started at 1300 while he was resting, rates his pain at 5 on a 0-10 scale, has not tried any medication at home, he reports he was just discharged from [facility] on 01/09/24 and has been off all his medication since that time

## 2024-01-11 NOTE — ED NOTES
Called patient's mother at his request to let her know that he was here. She states that he was discharged from [facility] on 01/09/24 which he what he reports as well. She said she tried to get him to come stay with her but that he refused because she does not smoke, drink, or do drugs and does not allow it in her home. She states she does not feel he was ready to be discharged from [facility]. She does say that we can call her if he is discharged and she will come and get him.

## 2024-01-25 ENCOUNTER — HOSPITAL ENCOUNTER (EMERGENCY)
Facility: HOSPITAL | Age: 44
Discharge: HOME | End: 2024-01-25
Payer: MEDICAID

## 2024-01-25 VITALS
HEART RATE: 75 BPM | OXYGEN SATURATION: 100 % | WEIGHT: 220 LBS | SYSTOLIC BLOOD PRESSURE: 130 MMHG | RESPIRATION RATE: 16 BRPM | DIASTOLIC BLOOD PRESSURE: 83 MMHG | BODY MASS INDEX: 29.16 KG/M2 | TEMPERATURE: 99 F | HEIGHT: 73 IN

## 2024-01-25 DIAGNOSIS — F19.10 DRUG ABUSE: ICD-10-CM

## 2024-01-25 DIAGNOSIS — R45.851 SUICIDAL IDEATION: Primary | ICD-10-CM

## 2024-01-25 DIAGNOSIS — Z00.8 MEDICAL CLEARANCE FOR PSYCHIATRIC ADMISSION: ICD-10-CM

## 2024-01-25 DIAGNOSIS — F19.20 DRUG ADDICTION: ICD-10-CM

## 2024-01-25 DIAGNOSIS — Z91.51 HISTORY OF ATTEMPTED SUICIDE: ICD-10-CM

## 2024-01-25 LAB
ALBUMIN SERPL BCP-MCNC: 3.8 G/DL (ref 3.5–5)
ALBUMIN/GLOB SERPL: 1.1 {RATIO}
ALP SERPL-CCNC: 56 U/L (ref 45–115)
ALT SERPL W P-5'-P-CCNC: 20 U/L (ref 16–61)
AMPHET UR QL SCN: NEGATIVE
ANION GAP SERPL CALCULATED.3IONS-SCNC: 10 MMOL/L (ref 7–16)
APAP SERPL-MCNC: <2 ΜG/ML (ref 10–30)
AST SERPL W P-5'-P-CCNC: 19 U/L (ref 15–37)
BARBITURATES UR QL SCN: NEGATIVE
BASOPHILS # BLD AUTO: 0.01 K/UL (ref 0–0.2)
BASOPHILS NFR BLD AUTO: 0.1 % (ref 0–1)
BENZODIAZ METAB UR QL SCN: NEGATIVE
BILIRUB SERPL-MCNC: 0.4 MG/DL (ref ?–1.2)
BILIRUB UR QL STRIP: NEGATIVE
BUN SERPL-MCNC: 12 MG/DL (ref 7–18)
BUN/CREAT SERPL: 10 (ref 6–20)
CALCIUM SERPL-MCNC: 9.2 MG/DL (ref 8.5–10.1)
CANNABINOIDS UR QL SCN: POSITIVE
CHLORIDE SERPL-SCNC: 104 MMOL/L (ref 98–107)
CLARITY UR: CLEAR
CO2 SERPL-SCNC: 29 MMOL/L (ref 21–32)
COCAINE UR QL SCN: NEGATIVE
COLOR UR: YELLOW
CREAT SERPL-MCNC: 1.19 MG/DL (ref 0.7–1.3)
DIFFERENTIAL METHOD BLD: ABNORMAL
EGFR (NO RACE VARIABLE) (RUSH/TITUS): 77 ML/MIN/1.73M2
EOSINOPHIL # BLD AUTO: 0.12 K/UL (ref 0–0.5)
EOSINOPHIL NFR BLD AUTO: 1.7 % (ref 1–4)
ERYTHROCYTE [DISTWIDTH] IN BLOOD BY AUTOMATED COUNT: 12.3 % (ref 11.5–14.5)
ETHANOL, BLOOD (CATEGORY): NOT DETECTED
GLOBULIN SER-MCNC: 3.5 G/DL (ref 2–4)
GLUCOSE SERPL-MCNC: 93 MG/DL (ref 74–106)
GLUCOSE UR STRIP-MCNC: NEGATIVE MG/DL
HCT VFR BLD AUTO: 40.4 % (ref 40–54)
HGB BLD-MCNC: 13 G/DL (ref 13.5–18)
KETONES UR STRIP-SCNC: NORMAL MG/DL
LEUKOCYTE ESTERASE UR QL STRIP: NEGATIVE
LYMPHOCYTES # BLD AUTO: 1.81 K/UL (ref 1–4.8)
LYMPHOCYTES NFR BLD AUTO: 25.6 % (ref 27–41)
MCH RBC QN AUTO: 31.2 PG (ref 27–31)
MCHC RBC AUTO-ENTMCNC: 32.2 G/DL (ref 32–36)
MCV RBC AUTO: 96.9 FL (ref 80–96)
MONOCYTES # BLD AUTO: 0.29 K/UL (ref 0–0.8)
MONOCYTES NFR BLD AUTO: 4.1 % (ref 2–6)
MPC BLD CALC-MCNC: 9.4 FL (ref 9.4–12.4)
NEUTROPHILS # BLD AUTO: 4.85 K/UL (ref 1.8–7.7)
NEUTROPHILS NFR BLD AUTO: 68.5 % (ref 53–65)
NITRITE UR QL STRIP: NEGATIVE
OPIATES UR QL SCN: NEGATIVE
PCP UR QL SCN: NEGATIVE
PH UR STRIP: 7 PH UNITS
PLATELET # BLD AUTO: 281 K/UL (ref 150–400)
POTASSIUM SERPL-SCNC: 4.3 MMOL/L (ref 3.5–5.1)
PROT SERPL-MCNC: 7.3 G/DL (ref 6.4–8.2)
PROT UR QL STRIP: NEGATIVE
RBC # BLD AUTO: 4.17 M/UL (ref 4.6–6.2)
RBC # UR STRIP: NEGATIVE /UL
SALICYLATES SERPL-MCNC: 4.5 MG/DL (ref 3–30)
SODIUM SERPL-SCNC: 139 MMOL/L (ref 136–145)
SP GR UR STRIP: 1.02
TSH SERPL DL<=0.005 MIU/L-ACNC: 1.19 UIU/ML (ref 0.36–3.74)
UROBILINOGEN UR STRIP-ACNC: 1 MG/DL
VALPROATE SERPL-MCNC: 51 ΜG/ML (ref 50–100)
WBC # BLD AUTO: 7.08 K/UL (ref 4.5–11)

## 2024-01-25 PROCEDURE — 80053 COMPREHEN METABOLIC PANEL: CPT

## 2024-01-25 PROCEDURE — 80164 ASSAY DIPROPYLACETIC ACD TOT: CPT

## 2024-01-25 PROCEDURE — 81003 URINALYSIS AUTO W/O SCOPE: CPT | Mod: 59

## 2024-01-25 PROCEDURE — 80143 DRUG ASSAY ACETAMINOPHEN: CPT

## 2024-01-25 PROCEDURE — 82077 ASSAY SPEC XCP UR&BREATH IA: CPT

## 2024-01-25 PROCEDURE — 99285 EMERGENCY DEPT VISIT HI MDM: CPT

## 2024-01-25 PROCEDURE — 80307 DRUG TEST PRSMV CHEM ANLYZR: CPT

## 2024-01-25 PROCEDURE — 80179 DRUG ASSAY SALICYLATE: CPT

## 2024-01-25 PROCEDURE — 85025 COMPLETE CBC W/AUTO DIFF WBC: CPT

## 2024-01-25 PROCEDURE — 84443 ASSAY THYROID STIM HORMONE: CPT

## 2024-01-25 PROCEDURE — 99285 EMERGENCY DEPT VISIT HI MDM: CPT | Mod: ,,,

## 2024-01-25 NOTE — ED PROVIDER NOTES
Encounter Date: 1/25/2024       History     Chief Complaint   Patient presents with    Addiction Problem     Pt presents to ED per Lupe.  Pt states he wants to transfer to [facility] for addiction to marijuana. States he smokes five or six blunts a day. States he has been vaping THC today.     Suicidal     States he has thoughts of stabbing or hanging himself. Wants treatment for mariajuana addiction.      Patient presents to the ED by EMS for evaluation addiction and suicidal ideations.  Patient does report that he is currently addicted to marijuana.  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  He also reports suicidal ideations and reports that he plans to either hang or stab himself.  He also admits to prior suicide attempts in the past.  He reports that he is spoken with the crisis stabilization unit and [facility] where he has been admitted in the past and reports that they requested he go to the emergency department.  He reports that he has not currently done anything to harm himself but does have the thoughts.  He denies any other drug use or overdose at this time.  He does appear to be experiencing some flight of ideas.  He reports that he can neither confirm nor deny whether he is having any visual or auditory hallucinations at this time.  He was cooperative for the exam.  He was also agreeable to treatment at the time of the exam.    The history is provided by the patient and the EMS personnel.     Review of patient's allergies indicates:  No Known Allergies  Past Medical History:   Diagnosis Date    Hypothyroidism     Schizophrenia, unspecified      History reviewed. No pertinent surgical history.  No family history on file.  Social History     Tobacco Use    Smoking status: Every Day     Current packs/day: 1.00     Types: Cigarettes     Passive exposure:  Never    Smokeless tobacco: Never   Substance Use Topics    Alcohol use: Yes     Alcohol/week: 2.0 standard drinks of alcohol     Types: 2 Cans of beer per week    Drug use: Yes     Frequency: 3.0 times per week     Types: Marijuana     Review of Systems   Constitutional:  Negative for activity change, appetite change, fatigue and fever.   HENT:  Negative for congestion and trouble swallowing.    Eyes: Negative.    Respiratory:  Negative for cough and shortness of breath.    Cardiovascular:  Negative for chest pain and palpitations.   Gastrointestinal:  Negative for abdominal distention, abdominal pain, diarrhea, nausea and vomiting.   Endocrine: Negative.    Genitourinary: Negative.    Musculoskeletal:  Negative for arthralgias, back pain, neck pain and neck stiffness.   Skin:  Negative for color change, pallor and rash.   Allergic/Immunologic: Negative.    Neurological: Negative.    Hematological: Negative.    Psychiatric/Behavioral:  Positive for behavioral problems, decreased concentration and suicidal ideas. Negative for agitation and confusion. Hallucinations: unable to confirm or deny.The patient is nervous/anxious.    All other systems reviewed and are negative.      Physical Exam     Initial Vitals [01/25/24 1154]   BP Pulse Resp Temp SpO2   133/84 62 16 98.1 °F (36.7 °C) 99 %      MAP       --         Physical Exam    Constitutional: He appears well-developed and well-nourished. He is not diaphoretic. No distress.   HENT:   Head: Normocephalic and atraumatic.   Mouth/Throat: Oropharynx is clear and moist.   Eyes: Conjunctivae and EOM are normal. Pupils are equal, round, and reactive to light.   Neck: Neck supple.   Normal range of motion.  Cardiovascular:  Normal rate, regular rhythm and normal heart sounds.           Pulmonary/Chest: Breath sounds normal. No respiratory distress. He has no wheezes. He has no rhonchi. He has no rales. He exhibits no tenderness.   Abdominal: Abdomen is soft. Bowel sounds  are normal. He exhibits no distension. There is no abdominal tenderness. There is no rebound and no guarding.   Musculoskeletal:         General: Normal range of motion.      Cervical back: Normal range of motion and neck supple.     Neurological: He is alert and oriented to person, place, and time. He has normal strength. GCS score is 15. GCS eye subscore is 4. GCS verbal subscore is 5. GCS motor subscore is 6.   Skin: Skin is warm and dry. Capillary refill takes less than 2 seconds.   Psychiatric: His speech is normal. His mood appears anxious. His speech is not rapid and/or pressured. He is hyperactive. He is not agitated and not combative. Hallucinating: unable to confirm or deny.Cognition and memory are normal. He expresses impulsivity and inappropriate judgment. He expresses suicidal ideation. He expresses suicidal plans. He is attentive.         Medical Screening Exam   See Full Note    ED Course   Procedures  Labs Reviewed   DRUG SCREEN, URINE (BEAKER) - Abnormal; Notable for the following components:       Result Value    Cannabinoid, Urine Positive (*)     All other components within normal limits   ACETAMINOPHEN LEVEL - Abnormal; Notable for the following components:    Acetaminophen <2 (*)     All other components within normal limits   CBC WITH DIFFERENTIAL - Abnormal; Notable for the following components:    RBC 4.17 (*)     Hemoglobin 13.0 (*)     MCV 96.9 (*)     MCH 31.2 (*)     Neutrophils % 68.5 (*)     Lymphocytes % 25.6 (*)     All other components within normal limits   SALICYLATE LEVEL - Normal   CBC W/ AUTO DIFFERENTIAL    Narrative:     The following orders were created for panel order CBC auto differential.  Procedure                               Abnormality         Status                     ---------                               -----------         ------                     CBC with Differential[[id number]]       Abnormal            Final result                 Please view results for  these tests on the individual orders.   COMPREHENSIVE METABOLIC PANEL   ALCOHOL,MEDICAL (ETHANOL)   URINALYSIS, REFLEX TO URINE CULTURE   VALPROIC ACID   TSH          Imaging Results    None          Medications - No data to display  Medical Decision Making  Patient was received by EMS for evaluation of addiction and suicidal ideations.  Patient is cooperative at the time of the exam.  He reports that he would like to seek help for his marijuana addiction and suicidal ideations.  He is requesting to go to the crisis stabilization unit in [facility] where he has been treated previously in the past.  We have initiated the typical psychiatric workup, but the patient does have a history of hypothyroidism and also takes Depakote.  These orders have been placed but the tests are a send out and will not result today.  Patient does report compliance with all of his daily medications.  One hundred one suicide precaution protocol was also initiated.  We did speak with the staff at crisis stabilization unit about the patient and they have requested for us to send his records when his current workup is complete and they will assess for admission to their facility for further treatment and evaluation.    Amount and/or Complexity of Data Reviewed  Independent Historian:      Details: Patient presents to the ED by EMS for evaluation addiction and suicidal ideations.  Patient does report that he is currently addicted to marijuana.  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  He also reports suicidal ideations and reports that he plans to either hang or stab himself.  He also admits to prior suicide attempts in the past.  He reports that he is spoken with the crisis stabilization unit and [facility] where he has been admitted in the past and reports that they requested he go to the  emergency department.  He reports that he has not currently done anything to harm himself but does have the thoughts.  He denies any other drug use or overdose at this time.  He does appear to be experiencing some flight of ideas.  He reports that he can neither confirm nor deny whether he is having any visual or auditory hallucinations at this time.  He was cooperative for the exam.  He was also agreeable to treatment at the time of the exam.  Labs: ordered.     Details: CBC shows a WBC of 7.08, hemoglobin 13, hematocrit of 40.4, platelet count 281, urine drug screen positive for cannabinoid but otherwise unremarkable, urinalysis unremarkable.  Ethanol shows none detected, salicylate negative at 4.5, CMP unremarkable.  Acetaminophen negative at less than 2.    Risk  Risk Details: All historical, clinical, radiographic, and laboratory findings were reviewed with the patient/family in detail along with the indications for transport to a psych facility in in order to receive further evaluation and treatment.  All remaining questions and concerns were addressed at this time and the patient/family communicates understanding and agrees to proceed accordingly.  The patient will be transferred by [facility] ambulance services secondary to a need for ongoing monitoring and continued suicide precautions en route.  NOBLE WELDON  12:45 PM                    ED Course as of 01/25/24 1312   Thu Jan 25, 2024   1302 PFC order placed in chart was faxed to Leigh at the crisis stabilization unit in [facility] per her request. [MC]      ED Course User Index  [MC] Frank Alxe FNP                           Clinical Impression:   Final diagnoses:  [R45.851] Suicidal ideation (Primary)  [F19.10] Drug abuse  [Z91.51] History of attempted suicide  [F19.20] Drug addiction  [Z00.8] Medical clearance for psychiatric admission        ED Disposition Condition    Transfer to Another Facility Stable                 Frank Alex, [facility]  01/25/24 1317

## 2024-02-06 ENCOUNTER — OFFICE VISIT (OUTPATIENT)
Dept: FAMILY MEDICINE | Facility: CLINIC | Age: 44
End: 2024-02-06
Payer: MEDICAID

## 2024-02-06 ENCOUNTER — OFFICE VISIT (OUTPATIENT)
Dept: FAMILY MEDICINE | Facility: CLINIC | Age: 44
End: 2024-02-06
Payer: COMMERCIAL

## 2024-02-06 VITALS
HEIGHT: 73 IN | WEIGHT: 220 LBS | BODY MASS INDEX: 29.16 KG/M2 | HEIGHT: 73 IN | BODY MASS INDEX: 29.16 KG/M2 | SYSTOLIC BLOOD PRESSURE: 169 MMHG | WEIGHT: 220 LBS | DIASTOLIC BLOOD PRESSURE: 91 MMHG | HEART RATE: 90 BPM | HEART RATE: 90 BPM | DIASTOLIC BLOOD PRESSURE: 91 MMHG | SYSTOLIC BLOOD PRESSURE: 169 MMHG

## 2024-02-06 DIAGNOSIS — F20.9 SCHIZOPHRENIA, UNSPECIFIED TYPE: Chronic | ICD-10-CM

## 2024-02-06 DIAGNOSIS — Z04.6 INVOLUNTARY COMMITMENT: Primary | ICD-10-CM

## 2024-02-06 DIAGNOSIS — F20.9 SCHIZOPHRENIA, UNSPECIFIED TYPE: Primary | Chronic | ICD-10-CM

## 2024-02-06 DIAGNOSIS — Z04.6 INVOLUNTARY COMMITMENT: ICD-10-CM

## 2024-02-06 PROCEDURE — 99212 OFFICE O/P EST SF 10 MIN: CPT | Mod: ,,, | Performed by: FAMILY MEDICINE

## 2024-02-06 PROCEDURE — 3077F SYST BP >= 140 MM HG: CPT | Mod: CPTII,,, | Performed by: FAMILY MEDICINE

## 2024-02-06 PROCEDURE — 3008F BODY MASS INDEX DOCD: CPT | Mod: CPTII,,, | Performed by: FAMILY MEDICINE

## 2024-02-06 PROCEDURE — 1160F RVW MEDS BY RX/DR IN RCRD: CPT | Mod: CPTII,,, | Performed by: FAMILY MEDICINE

## 2024-02-06 PROCEDURE — 90889 PREPARATION OF REPORT: CPT | Mod: ,,, | Performed by: FAMILY MEDICINE

## 2024-02-06 PROCEDURE — 1159F MED LIST DOCD IN RCRD: CPT | Mod: CPTII,,, | Performed by: FAMILY MEDICINE

## 2024-02-06 PROCEDURE — 3080F DIAST BP >= 90 MM HG: CPT | Mod: CPTII,,, | Performed by: FAMILY MEDICINE

## 2024-02-06 NOTE — ASSESSMENT & PLAN NOTE
This is a chronic problem for patient with the progression of symptoms.  Recommend involuntary hospitalization for this patient to titrate medications secondary to schizophrenia

## 2024-02-06 NOTE — ASSESSMENT & PLAN NOTE
Patient was seen in the clinic day.  I did evaluate for involuntary commitment.  Patient did have a pressured speech with flight of ideas.  He admits to visual and auditory hallucinations.  He denies auditory hallucinations with commands.  He denies a desire to harm himself or others.  Patient is unable to recognize similis, metaphors, and has concrete thinking.    Recommend patient be committed involuntarily for titration of medications for history of schizophrenia.

## 2024-02-06 NOTE — PROGRESS NOTES
"              Jay Dave IV, DO  [facility] [place]  [address], [place] [address]  Phone: [phone]      Subjective     Name: Oli Lee   Sex: male  YOB: 1980 (43 y.o.)  MRN: [id number]  Visit Date: 02/06/2024   Chief Complaint: Mental Health Problem        HISTORY OF PRESENT ILLNESS:    Chief Complaint   Patient presents with    Mental Health Problem       HPI  See tests that keep you healthy and the problem oriented documentation below.    All of your core healthy metrics are met.      Portions of this note may have been created with voice recognition software. Occasional "wrong-word" or "sound-a-like" substitutions may have occurred due to the inherent limitations of voice recognition software. Please, read the note carefully and recognize, using context, where substitutions have occurred.     PAST MEDICAL HISTORY:  Significant Diagnoses - Patient  has a past medical history of Hypothyroidism and Schizophrenia, unspecified.  Medications - Patient has a current medication list which includes the following long-term medication(s): benztropine, cyclosporine, divalproex, haloperidol, levothyroxine, and trazodone.   Allergies - Patient has No Known Allergies.  Surgeries - Patient  has no past surgical history on file.  Family History - Patient family history is not on file.      SOCIAL HISTORY:  Tobacco - Patient  reports that he has been smoking cigarettes. He has never been exposed to tobacco smoke. He has never used smokeless tobacco.   Alcohol - Patient  reports current alcohol use of about 2.0 standard drinks of alcohol per week.   Recreational Drugs - Patient  reports current drug use. Frequency: 3.00 times per week. Drug: Marijuana.       Review of Systems   All other systems reviewed and are negative.       Past Medical History:   Diagnosis Date    Hypothyroidism     Schizophrenia, unspecified         Review of patient's allergies indicates:  No Known " "Allergies     No past surgical history on file.     No family history on file.    Current Outpatient Medications   Medication Instructions    benztropine (COGENTIN) 1 mg, Oral, 2 times daily    clotrimazole-betamethasone 1-0.05% (LOTRISONE) cream Topical (Top), 2 times daily    cycloSPORINE (RESTASIS) 0.05 % ophthalmic emulsion 1 drop, Right Eye, 2 times daily    divalproex (DEPAKOTE) 250 mg, Oral, Daily    haloperidoL (HALDOL) 10 MG tablet 1 tablet, Oral, Daily    levothyroxine (SYNTHROID) 50 mcg, Oral, Before breakfast    melatonin 10 mg Tab 1 tablet, Oral, Nightly PRN    traZODone (DESYREL) 50 MG tablet No dose, route, or frequency recorded.        Objective     BP (!) 169/91   Pulse 90   Ht 6' 1" (1.854 m)   Wt 99.8 kg (220 lb)   BMI 29.03 kg/m²     Physical Exam  Constitutional:       General: He is not in acute distress.     Appearance: Normal appearance. He is not ill-appearing.   HENT:      Head: Normocephalic and atraumatic.      Right Ear: External ear normal.      Left Ear: External ear normal.      Nose: Nose normal.   Eyes:      Extraocular Movements: Extraocular movements intact.   Cardiovascular:      Rate and Rhythm: Normal rate.   Pulmonary:      Effort: Pulmonary effort is normal.   Abdominal:      Palpations: Abdomen is soft.   Musculoskeletal:      Cervical back: Normal range of motion. No tenderness.   Neurological:      Mental Status: He is alert.   Psychiatric:         Mood and Affect: Mood normal.         Behavior: Behavior normal.        All recently obtained labs have been reviewed and discussed in detail with the patient.   Assessment     1. Involuntary commitment    2. Schizophrenia, unspecified type         Plan        Problem List Items Addressed This Visit          Psychiatric    Schizophrenia (Chronic)     This is a chronic problem for patient with the progression of symptoms.  Recommend involuntary hospitalization for this patient to titrate medications secondary to " schizophrenia            ID    Involuntary commitment - Primary     Patient was seen in the clinic day.  I did evaluate for involuntary commitment.  Patient did have a pressured speech with flight of ideas.  He admits to visual and auditory hallucinations.  He denies auditory hallucinations with commands.  He denies a desire to harm himself or others.  Patient is unable to recognize similis, metaphors, and has concrete thinking.    Recommend patient be committed involuntarily for titration of medications for history of schizophrenia.            No follow-ups on file.    Patient advised that is symptoms worsen, they should call or report directly to local emergency department.    Jay Dave, DO  [facility]

## 2024-02-07 NOTE — PROGRESS NOTES
Danny Gomez MD   [facility]  [facility] [address] [address]  [phone]      PATIENT NAME: Oli Lee  : 1980  DATE: 24  MRN: [id number]      Billing Provider: Danny Gomez MD  Level of Service: NE OFFICE/OUTPT VISIT, EST, LEVL II, 10-19 MIN COMMITMENT EVALUATION  Patient PCP Information       Provider PCP Type    Danny Gomez MD General            Reason for Visit / Chief Complaint: commitment       Update PCP  Update Chief Complaint         History of Present Illness / Problem Focused Workflow     Oli Lee presents to the clinic with commitment       42 yo AAM with schizophrenia who is here for a commitment evaluation.  He is not currently being treated for his schizophrenia.  He does use marijuana on a regular basis.  Per his mother, his mental status has deteriorated over the past month.  He has been wandering the streets and harassing neighbors.  He has delusional thoughts and illogical thought processes and has been having hallucinations.  He was recently seen st the ED for suicidal ideations and was sent to the [facility] in [place].  He admits to hearing voices.        Review of Systems     Review of Systems   Constitutional:  Negative for chills, fatigue and fever.   HENT:  Negative for sinus pressure/congestion, sore throat and trouble swallowing.    Respiratory:  Negative for cough, shortness of breath and wheezing.    Cardiovascular:  Negative for chest pain, palpitations and leg swelling.   Gastrointestinal:  Negative for abdominal pain, change in bowel habit, nausea and vomiting.   Integumentary:  Negative for rash.   Neurological:  Negative for dizziness, syncope, weakness, light-headedness, numbness and headaches.   Psychiatric/Behavioral:  Positive for behavioral problems, hallucinations and suicidal ideas. Negative for confusion.         Medical / Social / Family  History     Past Medical History:   Diagnosis Date    Hypothyroidism     Schizophrenia, unspecified        History reviewed. No pertinent surgical history.    Social History    reports that he has been smoking cigarettes. He has never been exposed to tobacco smoke. He has never used smokeless tobacco. He reports current alcohol use of about 2.0 standard drinks of alcohol per week. He reports current drug use. Frequency: 3.00 times per week. Drug: Marijuana.   Social History     Tobacco Use    Smoking status: Every Day     Current packs/day: 1.00     Types: Cigarettes     Passive exposure: Never    Smokeless tobacco: Never   Substance Use Topics    Alcohol use: Yes     Alcohol/week: 2.0 standard drinks of alcohol     Types: 2 Cans of beer per week    Drug use: Yes     Frequency: 3.0 times per week     Types: Marijuana       Family History  History reviewed. No pertinent family history.    Medications and Allergies     Medications  No outpatient medications have been marked as taking for the 2/6/24 encounter (Office Visit) with Danny Gomez MD.       Allergies  Review of patient's allergies indicates:  No Known Allergies    Physical Examination     Vitals:    02/06/24 1401   BP: (!) 169/91   Pulse: 90     Physical Exam  Vitals reviewed.   Constitutional:       Appearance: Normal appearance.   HENT:      Head: Normocephalic and atraumatic.   Eyes:      Conjunctiva/sclera: Conjunctivae normal.   Cardiovascular:      Rate and Rhythm: Normal rate and regular rhythm.   Pulmonary:      Effort: Pulmonary effort is normal.      Breath sounds: Normal breath sounds.   Abdominal:      General: There is no distension.      Palpations: Abdomen is soft.   Musculoskeletal:         General: Normal range of motion.   Skin:     General: Skin is warm and dry.   Neurological:      General: No focal deficit present.      Mental Status: He is alert.   Psychiatric:      Comments: FOI/logorrhea          Assessment and Plan (including  Health Maintenance)      Problem List  Smart Sets  Document Outside HM   :    Plan: I agree that he needs to be committed so he can resume medication management of his schizophrenia in a controlled environment        Health Maintenance Due   Topic Date Due    COVID-19 Vaccine (1) Never done    Pneumococcal Vaccines (Age 0-64) (1 of 2 - PCV) Never done    HIV Screening  Never done    TETANUS VACCINE  Never done    Influenza Vaccine (1) Never done       Problem List Items Addressed This Visit          Psychiatric    Schizophrenia - Primary (Chronic)       ID    Involuntary commitment       Health Maintenance Topics with due status: Not Due       Topic Last Completion Date    Lipid Panel 11/09/2022    Hemoglobin A1c (Diabetic Prevention Screening) 12/25/2023       No future appointments.         Signature:  MD GRAYSON [facility]  [facility] [place] [facility]  [address] [place] [address] [id number] [phone]    Date of encounter: 2/6/24

## 2024-05-23 ENCOUNTER — OFFICE VISIT (OUTPATIENT)
Dept: FAMILY MEDICINE | Facility: CLINIC | Age: 44
End: 2024-05-23
Payer: COMMERCIAL

## 2024-05-23 VITALS
HEIGHT: 73 IN | SYSTOLIC BLOOD PRESSURE: 115 MMHG | HEART RATE: 103 BPM | WEIGHT: 257.63 LBS | BODY MASS INDEX: 34.14 KG/M2 | DIASTOLIC BLOOD PRESSURE: 78 MMHG

## 2024-05-23 DIAGNOSIS — F20.9 SCHIZOPHRENIA, UNSPECIFIED TYPE: Chronic | ICD-10-CM

## 2024-05-23 DIAGNOSIS — E03.9 ACQUIRED HYPOTHYROIDISM: Primary | Chronic | ICD-10-CM

## 2024-05-23 DIAGNOSIS — H04.121 DRY EYE OF RIGHT SIDE: Chronic | ICD-10-CM

## 2024-05-23 DIAGNOSIS — G47.00 INSOMNIA, UNSPECIFIED TYPE: Chronic | ICD-10-CM

## 2024-05-23 PROCEDURE — 99214 OFFICE O/P EST MOD 30 MIN: CPT | Mod: ,,, | Performed by: FAMILY MEDICINE

## 2024-05-23 PROCEDURE — 3044F HG A1C LEVEL LT 7.0%: CPT | Mod: CPTII,,, | Performed by: FAMILY MEDICINE

## 2024-05-23 PROCEDURE — 3074F SYST BP LT 130 MM HG: CPT | Mod: CPTII,,, | Performed by: FAMILY MEDICINE

## 2024-05-23 PROCEDURE — 1159F MED LIST DOCD IN RCRD: CPT | Mod: CPTII,,, | Performed by: FAMILY MEDICINE

## 2024-05-23 PROCEDURE — 3008F BODY MASS INDEX DOCD: CPT | Mod: CPTII,,, | Performed by: FAMILY MEDICINE

## 2024-05-23 PROCEDURE — 1160F RVW MEDS BY RX/DR IN RCRD: CPT | Mod: CPTII,,, | Performed by: FAMILY MEDICINE

## 2024-05-23 PROCEDURE — 3078F DIAST BP <80 MM HG: CPT | Mod: CPTII,,, | Performed by: FAMILY MEDICINE

## 2024-05-23 RX ORDER — CYCLOSPORINE 0.5 MG/ML
1 EMULSION OPHTHALMIC 2 TIMES DAILY
Qty: 60 EACH | Refills: 11 | Status: SHIPPED | OUTPATIENT
Start: 2024-05-23 | End: 2025-05-23

## 2024-05-23 RX ORDER — OLANZAPINE 15 MG/1
15 TABLET ORAL NIGHTLY
Qty: 30 TABLET | Refills: 2 | Status: SHIPPED | OUTPATIENT
Start: 2024-05-23

## 2024-05-23 RX ORDER — LEVOTHYROXINE SODIUM 50 UG/1
50 TABLET ORAL
Qty: 90 TABLET | Refills: 1 | Status: SHIPPED | OUTPATIENT
Start: 2024-05-23

## 2024-05-23 RX ORDER — OLANZAPINE 15 MG/1
15 TABLET ORAL NIGHTLY
COMMUNITY
Start: 2024-05-15 | End: 2024-05-23 | Stop reason: SDUPTHER

## 2024-05-23 RX ORDER — ACETAMINOPHEN, DIPHENHYDRAMINE HCL, PHENYLEPHRINE HCL 325; 25; 5 MG/1; MG/1; MG/1
1 TABLET ORAL NIGHTLY PRN
Qty: 30 TABLET | Refills: 2 | Status: SHIPPED | OUTPATIENT
Start: 2024-05-23

## 2024-05-23 NOTE — PROGRESS NOTES
Danny Gomez MD   [facility]  [facility] [address] [address]  [phone]      PATIENT NAME: Oli Lee  : 1980  DATE: 24  MRN: [id number]      Billing Provider: Danny Gomez MD  Level of Service:   Patient PCP Information       Provider PCP Type    Danny Gomez MD General            Reason for Visit / Chief Complaint: Medication Refill       Update PCP  Update Chief Complaint         History of Present Illness / Problem Focused Workflow     Oli Lee presents to the clinic with Medication Refill       44 yo AAM here for follow up hypothyroidism, schizophrenia and chronic dry eye.  Needs meds refilled.  Does not have them with him and is poor historian.  He is not sure if he has zyprexa at home or not.        Review of Systems     Review of Systems   Constitutional:  Negative for chills, fatigue and fever.   HENT:  Negative for sinus pressure/congestion, sore throat and trouble swallowing.    Eyes:  Positive for eye dryness.   Respiratory:  Negative for cough, shortness of breath and wheezing.    Cardiovascular:  Negative for chest pain, palpitations and leg swelling.   Gastrointestinal:  Negative for abdominal pain, change in bowel habit, nausea and vomiting.   Integumentary:  Negative for rash.   Neurological:  Negative for dizziness, syncope, weakness, light-headedness, numbness and headaches.   Psychiatric/Behavioral:  Negative for confusion and hallucinations.         Medical / Social / Family History     Past Medical History:   Diagnosis Date    Hypothyroidism     Schizophrenia, unspecified        History reviewed. No pertinent surgical history.    Social History    reports that he has been smoking cigarettes. He has never been exposed to tobacco smoke. He has never used smokeless tobacco. He reports current alcohol use of about 2.0 standard drinks of alcohol per week. He reports  current drug use. Frequency: 3.00 times per week. Drug: Marijuana.   Social History     Tobacco Use    Smoking status: Every Day     Current packs/day: 1.00     Types: Cigarettes     Passive exposure: Never    Smokeless tobacco: Never   Substance Use Topics    Alcohol use: Yes     Alcohol/week: 2.0 standard drinks of alcohol     Types: 2 Cans of beer per week    Drug use: Yes     Frequency: 3.0 times per week     Types: Marijuana       Family History  No family history on file.    Medications and Allergies     Medications  Outpatient Medications Marked as Taking for the 5/23/24 encounter (Office Visit) with Danny Gomez MD   Medication Sig Dispense Refill    [DISCONTINUED] OLANZapine (ZYPREXA) 15 MG Tab Take 15 mg by mouth nightly.         Allergies  Review of patient's allergies indicates:  No Known Allergies    Physical Examination     Vitals:    05/23/24 1344   BP: 115/78   Pulse: 103     Physical Exam  Vitals reviewed.   Constitutional:       Appearance: Normal appearance.   HENT:      Head: Normocephalic and atraumatic.   Eyes:      Conjunctiva/sclera: Conjunctivae normal.      Pupils: Pupils are equal, round, and reactive to light.   Cardiovascular:      Rate and Rhythm: Normal rate and regular rhythm.   Pulmonary:      Effort: Pulmonary effort is normal.      Breath sounds: Normal breath sounds.   Musculoskeletal:         General: Normal range of motion.   Skin:     General: Skin is warm and dry.   Neurological:      General: No focal deficit present.      Mental Status: He is alert and oriented to person, place, and time.   Psychiatric:         Mood and Affect: Mood normal.         Behavior: Behavior normal.          Assessment and Plan (including Health Maintenance)      Problem List  Smart Sets  Document Outside HM   :    Plan:         Health Maintenance Due   Topic Date Due    Pneumococcal Vaccines (Age 0-64) (1 of 2 - PCV) Never done    HIV Screening  Never done    TETANUS VACCINE  Never done     COVID-19 Vaccine (1 - 2023-24 season) Never done       Problem List Items Addressed This Visit          Psychiatric    Schizophrenia (Chronic)    Relevant Medications    OLANZapine (ZYPREXA) 15 MG Tab       Ophtho    Dry eye of right side (Chronic)    Relevant Medications    cycloSPORINE (RESTASIS) 0.05 % ophthalmic emulsion       Endocrine    Hypothyroidism - Primary (Chronic)    Relevant Medications    levothyroxine (SYNTHROID) 50 MCG tablet     Other Visit Diagnoses       Insomnia, unspecified type  (Chronic)       Relevant Medications    melatonin 10 mg Tab            Health Maintenance Topics with due status: Not Due       Topic Last Completion Date    Hemoglobin A1c (Diabetic Prevention Screening) 02/12/2024    Lipid Panel 03/25/2024    Influenza Vaccine Not Due       No future appointments.         Signature:  MD GRAYSON [facility]  [facility] [place] [facility]  [address] [place] [id number] [phone]    Date of encounter: 5/23/24

## 2024-08-08 ENCOUNTER — HOSPITAL ENCOUNTER (INPATIENT)
Facility: HOSPITAL | Age: 44
LOS: 4 days | Discharge: PSYCHIATRIC OR SUBSTANCE USE FACILITY | DRG: 683 | End: 2024-08-12
Attending: INTERNAL MEDICINE | Admitting: INTERNAL MEDICINE
Payer: COMMERCIAL

## 2024-08-08 ENCOUNTER — HOSPITAL ENCOUNTER (EMERGENCY)
Facility: HOSPITAL | Age: 44
Discharge: PSYCHIATRIC OR SUBSTANCE USE FACILITY | End: 2024-08-08
Payer: COMMERCIAL

## 2024-08-08 VITALS
TEMPERATURE: 99 F | OXYGEN SATURATION: 98 % | HEART RATE: 86 BPM | BODY MASS INDEX: 27.83 KG/M2 | HEIGHT: 73 IN | RESPIRATION RATE: 18 BRPM | SYSTOLIC BLOOD PRESSURE: 107 MMHG | DIASTOLIC BLOOD PRESSURE: 69 MMHG | WEIGHT: 210 LBS

## 2024-08-08 DIAGNOSIS — N17.1 ACUTE RENAL FAILURE WITH ACUTE CORTICAL NECROSIS: ICD-10-CM

## 2024-08-08 DIAGNOSIS — R74.01 TRANSAMINITIS: ICD-10-CM

## 2024-08-08 DIAGNOSIS — Z00.8 MEDICAL CLEARANCE FOR PSYCHIATRIC ADMISSION: ICD-10-CM

## 2024-08-08 DIAGNOSIS — M62.82 NON-TRAUMATIC RHABDOMYOLYSIS: Primary | ICD-10-CM

## 2024-08-08 DIAGNOSIS — F23 ACUTE PSYCHOSIS: ICD-10-CM

## 2024-08-08 DIAGNOSIS — N17.9 ACUTE KIDNEY INJURY: ICD-10-CM

## 2024-08-08 DIAGNOSIS — R45.1 AGITATION: ICD-10-CM

## 2024-08-08 DIAGNOSIS — R46.89 COMBATIVE BEHAVIOR: ICD-10-CM

## 2024-08-08 DIAGNOSIS — Z86.59 HX OF SCHIZOPHRENIA: ICD-10-CM

## 2024-08-08 DIAGNOSIS — E03.9 ACQUIRED HYPOTHYROIDISM: ICD-10-CM

## 2024-08-08 DIAGNOSIS — F20.0 ACUTE EXACERBATION OF CHRONIC PARANOID SCHIZOPHRENIA: ICD-10-CM

## 2024-08-08 DIAGNOSIS — N17.9 ACUTE RENAL FAILURE: ICD-10-CM

## 2024-08-08 DIAGNOSIS — R44.3 HALLUCINATIONS: ICD-10-CM

## 2024-08-08 DIAGNOSIS — M62.82 RHABDOMYOLYSIS: ICD-10-CM

## 2024-08-08 LAB
ALBUMIN SERPL BCP-MCNC: 4.7 G/DL (ref 3.5–5)
ALBUMIN/GLOB SERPL: 1.1 {RATIO}
ALP SERPL-CCNC: 92 U/L (ref 45–115)
ALT SERPL W P-5'-P-CCNC: 67 U/L (ref 16–61)
AMORPH PHOS CRY #/AREA URNS LPF: ABNORMAL /LPF
AMPHET UR QL SCN: NEGATIVE
ANION GAP SERPL CALCULATED.3IONS-SCNC: 17 MMOL/L (ref 7–16)
APAP SERPL-MCNC: <2 ΜG/ML (ref 10–30)
AST SERPL W P-5'-P-CCNC: 170 U/L (ref 15–37)
BACTERIA #/AREA URNS HPF: ABNORMAL /HPF
BARBITURATES UR QL SCN: NEGATIVE
BASOPHILS # BLD AUTO: 0.03 K/UL (ref 0–0.2)
BASOPHILS NFR BLD AUTO: 0.2 % (ref 0–1)
BENZODIAZ METAB UR QL SCN: POSITIVE
BILIRUB SERPL-MCNC: 0.9 MG/DL (ref ?–1.2)
BILIRUB UR QL STRIP: ABNORMAL
BUN SERPL-MCNC: 52 MG/DL (ref 7–18)
BUN/CREAT SERPL: 18 (ref 6–20)
CALCIUM SERPL-MCNC: 10 MG/DL (ref 8.5–10.1)
CANNABINOIDS UR QL SCN: POSITIVE
CHLORIDE SERPL-SCNC: 95 MMOL/L (ref 98–107)
CK SERPL-CCNC: ABNORMAL U/L (ref 39–308)
CLARITY UR: ABNORMAL
CO2 SERPL-SCNC: 26 MMOL/L (ref 21–32)
COCAINE UR QL SCN: NEGATIVE
COLOR UR: YELLOW
CREAT SERPL-MCNC: 2.87 MG/DL (ref 0.7–1.3)
CRP SERPL-MCNC: 1 MG/DL (ref 0–0.8)
DIFFERENTIAL METHOD BLD: ABNORMAL
EGFR (NO RACE VARIABLE) (RUSH/TITUS): 27 ML/MIN/1.73M2
EOSINOPHIL # BLD AUTO: 0.01 K/UL (ref 0–0.5)
EOSINOPHIL NFR BLD AUTO: 0.1 % (ref 1–4)
ERYTHROCYTE [DISTWIDTH] IN BLOOD BY AUTOMATED COUNT: 11.7 % (ref 11.5–14.5)
EST. AVERAGE GLUCOSE BLD GHB EST-MCNC: 111 MG/DL
ETHANOL, BLOOD (CATEGORY): NOT DETECTED
GLOBULIN SER-MCNC: 4.4 G/DL (ref 2–4)
GLUCOSE SERPL-MCNC: 191 MG/DL (ref 74–106)
GLUCOSE UR STRIP-MCNC: 100 MG/DL
HBA1C MFR BLD HPLC: 5.5 % (ref 4.5–6.6)
HCT VFR BLD AUTO: 49.6 % (ref 40–54)
HGB BLD-MCNC: 17 G/DL (ref 13.5–18)
IMM GRANULOCYTES # BLD AUTO: 0.06 K/UL (ref 0–0.04)
IMM GRANULOCYTES NFR BLD: 0.4 % (ref 0–0.4)
KETONES UR STRIP-SCNC: ABNORMAL MG/DL
LEUKOCYTE ESTERASE UR QL STRIP: NEGATIVE
LYMPHOCYTES # BLD AUTO: 1.68 K/UL (ref 1–4.8)
LYMPHOCYTES NFR BLD AUTO: 9.9 % (ref 27–41)
MAGNESIUM SERPL-MCNC: 1.8 MG/DL (ref 1.7–2.3)
MCH RBC QN AUTO: 30.9 PG (ref 27–31)
MCHC RBC AUTO-ENTMCNC: 34.3 G/DL (ref 32–36)
MCV RBC AUTO: 90 FL (ref 80–96)
MONOCYTES # BLD AUTO: 0.95 K/UL (ref 0–0.8)
MONOCYTES NFR BLD AUTO: 5.6 % (ref 2–6)
MPC BLD CALC-MCNC: 9.6 FL (ref 9.4–12.4)
MUCOUS THREADS #/AREA URNS HPF: ABNORMAL /HPF
NEUTROPHILS # BLD AUTO: 14.29 K/UL (ref 1.8–7.7)
NEUTROPHILS NFR BLD AUTO: 83.8 % (ref 53–65)
NITRITE UR QL STRIP: NEGATIVE
NRBC # BLD AUTO: 0 X10E3/UL
NRBC, AUTO (.00): 0 %
OPIATES UR QL SCN: NEGATIVE
PCP UR QL SCN: NEGATIVE
PH UR STRIP: 5.5 PH UNITS
PLATELET # BLD AUTO: 384 K/UL (ref 150–400)
POTASSIUM SERPL-SCNC: 4.4 MMOL/L (ref 3.5–5.1)
PROT SERPL-MCNC: 9.1 G/DL (ref 6.4–8.2)
PROT UR QL STRIP: 100
RBC # BLD AUTO: 5.51 M/UL (ref 4.6–6.2)
RBC # UR STRIP: ABNORMAL /UL
RBC #/AREA URNS HPF: ABNORMAL /HPF
SALICYLATES SERPL-MCNC: 3.1 MG/DL (ref 3–30)
SARS-COV-2 RDRP RESP QL NAA+PROBE: NEGATIVE
SODIUM SERPL-SCNC: 134 MMOL/L (ref 136–145)
SP GR UR STRIP: 1.02
SQUAMOUS #/AREA URNS LPF: ABNORMAL /LPF
TSH SERPL DL<=0.005 MIU/L-ACNC: 1.51 UIU/ML (ref 0.36–3.74)
UROBILINOGEN UR STRIP-ACNC: 1 MG/DL
VALPROATE SERPL-MCNC: <3 ΜG/ML (ref 50–100)
WBC # BLD AUTO: 17.02 K/UL (ref 4.5–11)
WBC #/AREA URNS HPF: ABNORMAL /HPF

## 2024-08-08 PROCEDURE — 86038 ANTINUCLEAR ANTIBODIES: CPT

## 2024-08-08 PROCEDURE — 80143 DRUG ASSAY ACETAMINOPHEN: CPT

## 2024-08-08 PROCEDURE — 96372 THER/PROPH/DIAG INJ SC/IM: CPT

## 2024-08-08 PROCEDURE — 36415 COLL VENOUS BLD VENIPUNCTURE: CPT

## 2024-08-08 PROCEDURE — 82077 ASSAY SPEC XCP UR&BREATH IA: CPT

## 2024-08-08 PROCEDURE — 96361 HYDRATE IV INFUSION ADD-ON: CPT

## 2024-08-08 PROCEDURE — 99285 EMERGENCY DEPT VISIT HI MDM: CPT | Mod: ,,,

## 2024-08-08 PROCEDURE — 81003 URINALYSIS AUTO W/O SCOPE: CPT

## 2024-08-08 PROCEDURE — 87389 HIV-1 AG W/HIV-1&-2 AB AG IA: CPT

## 2024-08-08 PROCEDURE — 80307 DRUG TEST PRSMV CHEM ANLYZR: CPT

## 2024-08-08 PROCEDURE — 82550 ASSAY OF CK (CPK): CPT

## 2024-08-08 PROCEDURE — 93005 ELECTROCARDIOGRAM TRACING: CPT

## 2024-08-08 PROCEDURE — 25000003 PHARM REV CODE 250

## 2024-08-08 PROCEDURE — 80179 DRUG ASSAY SALICYLATE: CPT

## 2024-08-08 PROCEDURE — 27000221 HC OXYGEN, UP TO 24 HOURS

## 2024-08-08 PROCEDURE — 63600175 PHARM REV CODE 636 W HCPCS

## 2024-08-08 PROCEDURE — 20000000 HC ICU ROOM

## 2024-08-08 PROCEDURE — 96376 TX/PRO/DX INJ SAME DRUG ADON: CPT

## 2024-08-08 PROCEDURE — 86780 TREPONEMA PALLIDUM: CPT

## 2024-08-08 PROCEDURE — 87635 SARS-COV-2 COVID-19 AMP PRB: CPT

## 2024-08-08 PROCEDURE — 25000003 PHARM REV CODE 250: Performed by: INTERNAL MEDICINE

## 2024-08-08 PROCEDURE — 80053 COMPREHEN METABOLIC PANEL: CPT

## 2024-08-08 PROCEDURE — 94761 N-INVAS EAR/PLS OXIMETRY MLT: CPT

## 2024-08-08 PROCEDURE — 84439 ASSAY OF FREE THYROXINE: CPT

## 2024-08-08 PROCEDURE — 99285 EMERGENCY DEPT VISIT HI MDM: CPT | Mod: 25

## 2024-08-08 PROCEDURE — 85025 COMPLETE CBC W/AUTO DIFF WBC: CPT

## 2024-08-08 PROCEDURE — 99223 1ST HOSP IP/OBS HIGH 75: CPT | Mod: ,,, | Performed by: HOSPITALIST

## 2024-08-08 PROCEDURE — 51702 INSERT TEMP BLADDER CATH: CPT

## 2024-08-08 PROCEDURE — 63600175 PHARM REV CODE 636 W HCPCS: Performed by: HOSPITALIST

## 2024-08-08 PROCEDURE — 25000003 PHARM REV CODE 250: Performed by: HOSPITALIST

## 2024-08-08 PROCEDURE — 84443 ASSAY THYROID STIM HORMONE: CPT

## 2024-08-08 PROCEDURE — 82607 VITAMIN B-12: CPT

## 2024-08-08 PROCEDURE — 83036 HEMOGLOBIN GLYCOSYLATED A1C: CPT

## 2024-08-08 PROCEDURE — 80074 ACUTE HEPATITIS PANEL: CPT

## 2024-08-08 PROCEDURE — 96374 THER/PROPH/DIAG INJ IV PUSH: CPT | Mod: 59

## 2024-08-08 PROCEDURE — 83735 ASSAY OF MAGNESIUM: CPT

## 2024-08-08 PROCEDURE — 80164 ASSAY DIPROPYLACETIC ACD TOT: CPT

## 2024-08-08 PROCEDURE — 84425 ASSAY OF VITAMIN B-1: CPT | Mod: 90

## 2024-08-08 PROCEDURE — 86140 C-REACTIVE PROTEIN: CPT

## 2024-08-08 RX ORDER — DIPHENHYDRAMINE HYDROCHLORIDE 50 MG/ML
50 INJECTION INTRAMUSCULAR; INTRAVENOUS
Status: COMPLETED | OUTPATIENT
Start: 2024-08-08 | End: 2024-08-08

## 2024-08-08 RX ORDER — OLANZAPINE 2.5 MG/1
10 TABLET ORAL ONCE
Status: COMPLETED | OUTPATIENT
Start: 2024-08-08 | End: 2024-08-08

## 2024-08-08 RX ORDER — MIDAZOLAM HYDROCHLORIDE 5 MG/ML
10 INJECTION INTRAMUSCULAR; INTRAVENOUS ONCE
Status: COMPLETED | OUTPATIENT
Start: 2024-08-08 | End: 2024-08-08

## 2024-08-08 RX ORDER — MUPIROCIN 20 MG/G
OINTMENT TOPICAL 2 TIMES DAILY
Status: DISCONTINUED | OUTPATIENT
Start: 2024-08-08 | End: 2024-08-13 | Stop reason: HOSPADM

## 2024-08-08 RX ORDER — BISACODYL 5 MG
10 TABLET, DELAYED RELEASE (ENTERIC COATED) ORAL DAILY PRN
Status: DISCONTINUED | OUTPATIENT
Start: 2024-08-08 | End: 2024-08-13 | Stop reason: HOSPADM

## 2024-08-08 RX ORDER — SODIUM CHLORIDE 9 MG/ML
1000 INJECTION, SOLUTION INTRAVENOUS
Status: COMPLETED | OUTPATIENT
Start: 2024-08-08 | End: 2024-08-08

## 2024-08-08 RX ORDER — DIPHENHYDRAMINE HYDROCHLORIDE 50 MG/ML
50 INJECTION INTRAMUSCULAR; INTRAVENOUS EVERY 6 HOURS PRN
Status: DISCONTINUED | OUTPATIENT
Start: 2024-08-08 | End: 2024-08-13 | Stop reason: HOSPADM

## 2024-08-08 RX ORDER — OLANZAPINE 5 MG/1
15 TABLET ORAL NIGHTLY
Status: DISCONTINUED | OUTPATIENT
Start: 2024-08-08 | End: 2024-08-13 | Stop reason: HOSPADM

## 2024-08-08 RX ORDER — TRAZODONE HYDROCHLORIDE 50 MG/1
50 TABLET ORAL NIGHTLY
Status: DISCONTINUED | OUTPATIENT
Start: 2024-08-08 | End: 2024-08-13 | Stop reason: HOSPADM

## 2024-08-08 RX ORDER — LEVOTHYROXINE SODIUM 50 UG/1
50 TABLET ORAL NIGHTLY
Status: DISCONTINUED | OUTPATIENT
Start: 2024-08-09 | End: 2024-08-13 | Stop reason: HOSPADM

## 2024-08-08 RX ORDER — BENZTROPINE MESYLATE 0.5 MG/1
2 TABLET ORAL NIGHTLY
Status: DISCONTINUED | OUTPATIENT
Start: 2024-08-09 | End: 2024-08-13 | Stop reason: HOSPADM

## 2024-08-08 RX ORDER — BENZTROPINE MESYLATE 0.5 MG/1
2 TABLET ORAL NIGHTLY
Status: DISCONTINUED | OUTPATIENT
Start: 2024-08-08 | End: 2024-08-08

## 2024-08-08 RX ORDER — MIDAZOLAM HYDROCHLORIDE 5 MG/ML
10 INJECTION INTRAMUSCULAR; INTRAVENOUS ONCE
Status: DISCONTINUED | OUTPATIENT
Start: 2024-08-08 | End: 2024-08-08

## 2024-08-08 RX ORDER — SODIUM CHLORIDE 9 MG/ML
1000 INJECTION, SOLUTION INTRAVENOUS CONTINUOUS
Status: DISCONTINUED | OUTPATIENT
Start: 2024-08-08 | End: 2024-08-08 | Stop reason: HOSPADM

## 2024-08-08 RX ORDER — MIDAZOLAM HYDROCHLORIDE 2 MG/2ML
2 INJECTION, SOLUTION INTRAMUSCULAR; INTRAVENOUS
Status: DISCONTINUED | OUTPATIENT
Start: 2024-08-08 | End: 2024-08-08 | Stop reason: HOSPADM

## 2024-08-08 RX ORDER — TALC
6 POWDER (GRAM) TOPICAL NIGHTLY PRN
Status: DISCONTINUED | OUTPATIENT
Start: 2024-08-08 | End: 2024-08-13 | Stop reason: HOSPADM

## 2024-08-08 RX ORDER — ONDANSETRON HYDROCHLORIDE 2 MG/ML
8 INJECTION, SOLUTION INTRAVENOUS EVERY 6 HOURS PRN
Status: DISCONTINUED | OUTPATIENT
Start: 2024-08-08 | End: 2024-08-13 | Stop reason: HOSPADM

## 2024-08-08 RX ORDER — HALOPERIDOL 5 MG/ML
5 INJECTION INTRAMUSCULAR
Status: COMPLETED | OUTPATIENT
Start: 2024-08-08 | End: 2024-08-08

## 2024-08-08 RX ORDER — HEPARIN SODIUM 5000 [USP'U]/ML
5000 INJECTION, SOLUTION INTRAVENOUS; SUBCUTANEOUS EVERY 12 HOURS
Status: DISCONTINUED | OUTPATIENT
Start: 2024-08-08 | End: 2024-08-09

## 2024-08-08 RX ORDER — DIAZEPAM 5 MG/1
5 TABLET ORAL EVERY 8 HOURS
Status: DISCONTINUED | OUTPATIENT
Start: 2024-08-08 | End: 2024-08-08 | Stop reason: HOSPADM

## 2024-08-08 RX ORDER — BENZTROPINE MESYLATE 0.5 MG/1
1 TABLET ORAL 2 TIMES DAILY
Status: DISCONTINUED | OUTPATIENT
Start: 2024-08-08 | End: 2024-08-08

## 2024-08-08 RX ORDER — TRAZODONE HYDROCHLORIDE 50 MG/1
50 TABLET ORAL NIGHTLY PRN
Status: DISCONTINUED | OUTPATIENT
Start: 2024-08-08 | End: 2024-08-09

## 2024-08-08 RX ORDER — GUAIFENESIN AND DEXTROMETHORPHAN HYDROBROMIDE 10; 100 MG/5ML; MG/5ML
10 SYRUP ORAL EVERY 6 HOURS PRN
Status: DISCONTINUED | OUTPATIENT
Start: 2024-08-08 | End: 2024-08-13 | Stop reason: HOSPADM

## 2024-08-08 RX ORDER — ACETAMINOPHEN 500 MG
1000 TABLET ORAL EVERY 6 HOURS PRN
Status: DISCONTINUED | OUTPATIENT
Start: 2024-08-08 | End: 2024-08-13 | Stop reason: HOSPADM

## 2024-08-08 RX ORDER — LEVOTHYROXINE SODIUM 50 UG/1
50 TABLET ORAL
Status: DISCONTINUED | OUTPATIENT
Start: 2024-08-09 | End: 2024-08-08

## 2024-08-08 RX ORDER — SIMETHICONE 80 MG
1 TABLET,CHEWABLE ORAL 3 TIMES DAILY PRN
Status: DISCONTINUED | OUTPATIENT
Start: 2024-08-08 | End: 2024-08-13 | Stop reason: HOSPADM

## 2024-08-08 RX ORDER — QUETIAPINE FUMARATE 25 MG/1
50 TABLET, FILM COATED ORAL 2 TIMES DAILY
Status: DISCONTINUED | OUTPATIENT
Start: 2024-08-08 | End: 2024-08-08 | Stop reason: HOSPADM

## 2024-08-08 RX ORDER — OLANZAPINE 10 MG/2ML
10 INJECTION, POWDER, FOR SOLUTION INTRAMUSCULAR EVERY 8 HOURS PRN
Status: DISCONTINUED | OUTPATIENT
Start: 2024-08-08 | End: 2024-08-13 | Stop reason: HOSPADM

## 2024-08-08 RX ORDER — SODIUM CHLORIDE, SODIUM LACTATE, POTASSIUM CHLORIDE, CALCIUM CHLORIDE 600; 310; 30; 20 MG/100ML; MG/100ML; MG/100ML; MG/100ML
INJECTION, SOLUTION INTRAVENOUS CONTINUOUS
Status: DISCONTINUED | OUTPATIENT
Start: 2024-08-08 | End: 2024-08-13 | Stop reason: HOSPADM

## 2024-08-08 RX ORDER — LORAZEPAM 2 MG/ML
1 INJECTION INTRAMUSCULAR
Status: COMPLETED | OUTPATIENT
Start: 2024-08-08 | End: 2024-08-08

## 2024-08-08 RX ORDER — MIDAZOLAM HYDROCHLORIDE 2 MG/2ML
10 INJECTION, SOLUTION INTRAMUSCULAR; INTRAVENOUS ONCE
Status: DISCONTINUED | OUTPATIENT
Start: 2024-08-08 | End: 2024-08-08

## 2024-08-08 RX ORDER — LEVOTHYROXINE SODIUM 50 UG/1
50 TABLET ORAL NIGHTLY
Status: DISCONTINUED | OUTPATIENT
Start: 2024-08-08 | End: 2024-08-08

## 2024-08-08 RX ADMIN — MUPIROCIN: 20 OINTMENT TOPICAL at 08:08

## 2024-08-08 RX ADMIN — LORAZEPAM 1 MG: 2 INJECTION INTRAMUSCULAR; INTRAVENOUS at 12:08

## 2024-08-08 RX ADMIN — HALOPERIDOL LACTATE 5 MG: 5 INJECTION, SOLUTION INTRAMUSCULAR at 03:08

## 2024-08-08 RX ADMIN — SODIUM CHLORIDE, POTASSIUM CHLORIDE, SODIUM LACTATE AND CALCIUM CHLORIDE: 600; 310; 30; 20 INJECTION, SOLUTION INTRAVENOUS at 08:08

## 2024-08-08 RX ADMIN — SODIUM CHLORIDE 1000 ML: 9 INJECTION, SOLUTION INTRAVENOUS at 11:08

## 2024-08-08 RX ADMIN — SODIUM CHLORIDE 1000 ML: 9 INJECTION, SOLUTION INTRAVENOUS at 01:08

## 2024-08-08 RX ADMIN — TRAZODONE HYDROCHLORIDE 50 MG: 50 TABLET ORAL at 08:08

## 2024-08-08 RX ADMIN — DIAZEPAM 5 MG: 5 TABLET ORAL at 11:08

## 2024-08-08 RX ADMIN — SODIUM CHLORIDE 1000 ML: 900 INJECTION, SOLUTION INTRAVENOUS at 04:08

## 2024-08-08 RX ADMIN — MIDAZOLAM HYDROCHLORIDE 2 MG: 1 INJECTION, SOLUTION INTRAMUSCULAR; INTRAVENOUS at 02:08

## 2024-08-08 RX ADMIN — OLANZAPINE 15 MG: 5 TABLET, FILM COATED ORAL at 08:08

## 2024-08-08 RX ADMIN — OLANZAPINE 10 MG: 2.5 TABLET, FILM COATED ORAL at 10:08

## 2024-08-08 RX ADMIN — DIPHENHYDRAMINE HYDROCHLORIDE 50 MG: 50 INJECTION INTRAMUSCULAR; INTRAVENOUS at 01:08

## 2024-08-08 RX ADMIN — SODIUM CHLORIDE 1000 ML: 9 INJECTION, SOLUTION INTRAVENOUS at 02:08

## 2024-08-08 RX ADMIN — QUETIAPINE FUMARATE 50 MG: 25 TABLET ORAL at 11:08

## 2024-08-08 RX ADMIN — MIDAZOLAM 10 MG: 5 INJECTION INTRAMUSCULAR; INTRAVENOUS at 01:08

## 2024-08-08 RX ADMIN — MIDAZOLAM HYDROCHLORIDE 2 MG: 1 INJECTION, SOLUTION INTRAMUSCULAR; INTRAVENOUS at 05:08

## 2024-08-08 RX ADMIN — BENZTROPINE MESYLATE 1 MG: 0.5 TABLET ORAL at 08:08

## 2024-08-08 RX ADMIN — SODIUM CHLORIDE 1000 ML: 9 INJECTION, SOLUTION INTRAVENOUS at 12:08

## 2024-08-08 RX ADMIN — HEPARIN SODIUM 5000 UNITS: 5000 INJECTION, SOLUTION INTRAVENOUS; SUBCUTANEOUS at 08:08

## 2024-08-08 RX ADMIN — MIDAZOLAM HYDROCHLORIDE 2 MG: 1 INJECTION, SOLUTION INTRAMUSCULAR; INTRAVENOUS at 03:08

## 2024-08-08 NOTE — ED NOTES
Call in to schedule telpsych, spoke with Isaak, she reports it is scheduled, cart placed just outside the patient room

## 2024-08-08 NOTE — PLAN OF CARE
Problem: Adult Inpatient Plan of Care  Goal: Plan of Care Review  Outcome: Progressing  Goal: Patient-Specific Goal (Individualized)  Outcome: Progressing  Goal: Absence of Hospital-Acquired Illness or Injury  Outcome: Progressing  Goal: Optimal Comfort and Wellbeing  Outcome: Progressing  Goal: Readiness for Transition of Care  Outcome: Progressing     Problem: Violence Risk or Actual  Goal: Anger and Impulse Control  Outcome: Progressing  Intervention: Minimize Safety Risk  Flowsheets (Taken 8/8/2024 5649)  Behavior Management:   boundaries reinforced   security enhancements provided

## 2024-08-08 NOTE — CONSULTS
[facility]  Psychiatry  Telepsychiatry Consult Note    Please see previous notes:    Patient agreeable to consultation via telepsychiatry.    Tele-Consultation from Psychiatry started: 8/8/2024 at 1035am      The chief complaint leading to psychiatric consultation is: unstated    [Unintelligible  ramblings]    This consultation was requested by , the Emergency Department attending physician.  The location of the consulting psychiatrist is  [place] .  The patient location is  [facility] EMERGENCY DEPART*   The patient arrived at the ED at:       Also present with the patient at the time of the consultation: mother        Patient Identification:   Oli Lee is a 44 y.o. male.    Patient information was obtained from patient, parent, and past medical records.  Patient presented voluntarily to the Emergency Department by private vehicle.    Consults  Consult Start Time: 08/08/2024 10:35 CDT  Consult End Time: 08/08/2024 11:10 CDT        Subjective:     History of Present Illness:  No notes on file     Per ER note  8/8/24  43 yo male who came to ER on 8/8/24  Mother brought pt to eR for eval  Mother says pt has hx  of schiz  Has been off meds  Has been talking to people  who are not there, trying to move his mobile home with his hands  Pt  has flight of ideas, wandering thoughs                    ======================================================  Telepsychiatry Assessment (8/8/24)  Per NP > labs are not yet done  They gave him olz 10 mg       ====================================  Interview with patient  (8/8/24)  Denies AH   Denies VH  Disorganized  Hard to understand him   Rambling about money   Angry, agitated  confused          ==================================  Collateral from mother (present in room)  Pt no longer wants services from [facility]  Last time was maybe in March 2024    Mother says that she has been noticing  pt not doing well   over past week  last night >>  pt  talking to someone  who was not there  laughed out loud, talked out loud in [place]    mother says her brother saw pt trying to mow lawn with his hands this morning  was using broom to sweep side of house        Current Medications at home >> none at this time     Allergies: nkda            Psychiatric History:   Hx of SI  Hx of psychosis      Previous Psychiatric Hospitalizations: yes  last time was maybe in Jan to March 2024  [facility] in [facility]    Previous Medication Trials: depakote, ? lithium    Previous Suicide Attempts:  yes    History of Violence:  unclear  History of Depression: unclear  History of Celina: flight of ideas    History of Auditory/Visual Hallucination   yes    History of Delusions:  unclear    Outpatient psychiatrist (current & past): not at this time  None since March 2024          Substance Abuse History:  Tobacco   1 ppd      Etoh  drinks beer        Illicits      cannabis  3x per week or more often       detox/rehab  unclear          Legal History: Past charges/incarcerations: unknown       Family Psychiatric History: deferred          Medical hx  From review of past EPIC encounters /problem list    Pericarditis (per 7/23/18 note in EPIC: pericarditis dx 7/16/18)    Hypothyroidism     ?   when on lithium in past }  HA          Other Epic encounters  1/25/24 ER visit      >  ? inpt psych   To ER by EMS for addiction, SI  Dependent on cannabis  Wants to hang or stab self  Admits to past SAs    1/11/24 ER visit  HA    12/7/23 outpt family med  Dry eye       Surgical Hx >> unclear      labs/Diagnostics > no results yet            Social History:  unable to assess at this time     Pt too irate, irritable  Will not cooperate    Developmental/Childhood:    Education    Employment    Relationship status:    Housing status:    [facility]      Access to gun:     Religious    rec activities/hobbies           Psychiatric Mental Status Exam:  Arousal:  alert  Sensorium/Orientation: oriented to person  appearance:  neat  Behavior/Cooperation: wnl  Speech: pressured  Language: speaking in spurts  Mood: unstated  Affect: angry, irritable    Thought Process: per ER      flight of ideas    Thought Content: has been talking to people not present    Auditory hallucinations:  ? assumed yes  Visual hallucinations: unclear  Paranoia:  yes  Delusions:  paranoid  Suicidal ideation:  unclear  Homicidal ideation:  unclear  Attention/Concentration: poor  Memory:  not tested  Fund of Knowledge: not tested  Abstract reasoning: not tested  Insight: poor  Judgment:  poor            Past Medical History: No past medical history on file.   Laboratory Data: Labs Reviewed - No data to display    Neurological History:  Seizures: unclear    Head trauma: unclear      Allergies:   Review of patient's allergies indicates:  Not on File    Medications in ER: Medications - No data to display    Medications at home:     No new subjective & objective note has been filed under this hospital service since the last note was generated.          Past Medical History:   Past Medical History:   Diagnosis Date    Hypothyroidism     Schizophrenia, unspecified       Laboratory Data:   Labs Reviewed   COMPREHENSIVE METABOLIC PANEL - Abnormal       Result Value    Sodium 134 (*)     Potassium 4.4      Chloride 95 (*)     CO2 26      Anion Gap 17 (*)     Glucose 191 (*)     BUN 52 (*)     Creatinine 2.87 (*)     BUN/Creatinine Ratio 18      Calcium 10.0      Total Protein 9.1 (*)     Albumin 4.7      Globulin 4.4 (*)     A/G Ratio 1.1      Bilirubin, Total 0.9      Alk Phos 92      ALT 67 (*)      (*)     eGFR 27 (*)    ACETAMINOPHEN LEVEL - Abnormal    Acetaminophen <2 (*)    SALICYLATE LEVEL - Normal    Salicylate 3.1     SARS-COV-2 RNA AMPLIFICATION, QUAL - Normal    SARS COV-2 Molecular Negative      Narrative:     Negative SARS-CoV results should not be used as the sole basis for treatment or patient management decisions; negative results should be considered  in the context of a patient's recent exposures, history and the presene of clinical signs and symptoms consistent with COVID-19.  Negative results should be treated as presumptive and confirmed by molecular assay, if necessary for patient management.   CBC W/ AUTO DIFFERENTIAL    Narrative:     The following orders were created for panel order CBC auto differential.  Procedure                               Abnormality         Status                     ---------                               -----------         ------                     CBC with Differential[[id number]]                           In process                   Please view results for these tests on the individual orders.   URINALYSIS, REFLEX TO URINE CULTURE   DRUG SCREEN, URINE (BEAKER)   TSH   CBC WITH DIFFERENTIAL   CK       Allergies:   Review of patient's allergies indicates:  No Known Allergies    Medications in ER:   Medications   sodium chloride 0.9% bolus 1,000 mL 1,000 mL (has no administration in time range)   OLANZapine tablet 10 mg (10 mg Oral Given 8/8/24 1018)       Medications at home:     No new subjective & objective note has been filed under this hospital service since the last note was generated.      Assessment - Diagnosis - Goals:   45 yo male with unclear hx of psychosis vs  psychotic ludwin,  occurring in context of past (possibly chronic) cannabis    Pt was last hospitalized psychiatrically in March 2024 > no treatments since that time.    8/8/24  ED day 1          telepsych 1 (jo[facility])  Pt  is angry, agitated  Will not participate  in interview  Mother present at bedside >> reports that pt has been delusional, erratic for past week  Following medical clearance (not yet done)  Recommend inpt  psych placement       ==============================  Diagnosis/Impression:   Unspecified mood disorder with psychotic features (manic vs hypomanic)  Hx of nicotine dependence  Hx of cannabis  dependence  Overweight              ====================  Recommendations:    1)legal  Recommend involuntary hold for grave disability   Potential DTS based on past hx of suicide attempts when psychotic/unstable        2)diagnostics    Recommend neuroimaging (never done ?)  EKG      RPR, HIV, acute hep panel  B1 and B12    B6, folate   (baseline labs for Anti Seizure Meds)  TSH, FT4  UA  UDS  BAL  COVID  LUIS ANGEL, CRP  (screening for autoimmune >> likely never done in past)        3)meds while in ER  Pt has received one dose of olanzapine  Due to weight (overweight), recommend no further dosing with olanzapine.    For agitation/hypomania/suspected hallucinations/paranoia  Begin Depakote or Depakene   500 mg po tid    Begin Valium 5 mg po tid x 72 hours    Begin seroquel 50 mg po bid          4)disposition   Inpatient psych placement as soon as medically cleared            Time with patient: 10 min    More than 50% of the time was spent counseling/coordinating care    Consulting clinician was informed of the encounter and consult note.    Consultation ended: 8/8/2024 at 1110am    Maria Del Rosario Anderson MD   Psychiatry  [facility]        Addendum @ 1120am  ER physician informed me that pt has elevated LFTs, ALIZA  Labs not available to me when I wrote my initial recommendations    Based on this new information, recommend  HOLDING OFF use of Depakote at this time due to LFTs.  Would NOT begin Trileptal or Tegretol as mood stabilizer due to hx of   Pericarditis.    Other recs still apply  >>  OK to begin Valium ASAP  OK to begin Seroquel later today    ER plans for possible medical admit to address ALIZA  Prior to disposition to psych facility

## 2024-08-08 NOTE — ED NOTES
Patient became aggitated again, started screaming and threatening staff again and pulling at medical equipment

## 2024-08-08 NOTE — ED NOTES
Patient asks to use the restroom, he is against the side rail of his bed, when he went to move off the side  rail he became combative, screaming and cursing, pulled out his IV, stormed from the ER to the parking lot, security and local PD responded, patient was able to be escorted back to his room where orders were quickly placed for chemical restraints

## 2024-08-08 NOTE — ED PROVIDER NOTES
Encounter Date: 8/8/2024       History     Chief Complaint   Patient presents with    Mental Health Problem     Mom states pt is having odd behavior such as trying to move his mobile home with his hands. Mom states she took him to [facility] this morning and pt stated he does not need their services.     Patient is a 44-year-old male who was brought to the emergency department by his mother for evaluation of odd behavior over the past week.  She does report that he has a known history of schizophrenia and she believes he was not taken this medication in quite some time.  She attempted to take him by [facility] this morning for evaluation but he reports that he was not trust them and has noted paranoia regarding their services.  She reports that he has been speaking to people that are not there and performing out behaviors like attempting to move the mobile home with his hands.  The patient denies any suicidal or homicidal ideations and also denies any auditory or visual hallucinations but it was noted to be speaking erratically even when no one else is present.  During communication he does have flight of ideas and wandering thoughts.  She reports that he was not been combative or aggressive thus far and he was currently cooperative with the staff here in the ED today.  He denies any chest pain, shortness of breath, fever, chills, cough, or any other complaints at this time.  Patient was denies any current drug use but does admit to intermittent vaping.  Current blood pressure is 140/104, temperature 99.2°, heart rate 105, respirations 18, and oxygen saturation 98% on room air.    The history is provided by the patient and a parent.     Review of patient's allergies indicates:  No Known Allergies  Past Medical History:   Diagnosis Date    Hypothyroidism     Involuntary commitment 02/06/2024    Schizophrenia, unspecified      History reviewed. No pertinent surgical history.  No family history on file.  Social  History     Tobacco Use    Smoking status: Every Day     Current packs/day: 1.00     Types: Cigarettes     Passive exposure: Never    Smokeless tobacco: Never   Substance Use Topics    Alcohol use: Yes     Alcohol/week: 2.0 standard drinks of alcohol     Types: 2 Cans of beer per week    Drug use: Not Currently     Frequency: 3.0 times per week     Types: Marijuana     Review of Systems   Constitutional:  Negative for activity change, appetite change and fever.   HENT:  Negative for congestion and sore throat.    Eyes: Negative.    Respiratory:  Negative for cough and shortness of breath.    Cardiovascular:  Negative for chest pain and palpitations.   Gastrointestinal:  Negative for abdominal pain, diarrhea, nausea and vomiting.   Endocrine: Negative.    Genitourinary:  Negative for difficulty urinating and dysuria.   Musculoskeletal:  Negative for arthralgias, back pain, neck pain and neck stiffness.   Skin:  Negative for color change, pallor and rash.   Allergic/Immunologic: Negative.    Neurological:  Negative for dizziness, seizures, syncope, speech difficulty, weakness and headaches.   Hematological:  Does not bruise/bleed easily.   Psychiatric/Behavioral:  Positive for hallucinations. Negative for agitation, self-injury and suicidal ideas. The patient is nervous/anxious and is hyperactive.    All other systems reviewed and are negative.      Physical Exam     Initial Vitals   BP Pulse Resp Temp SpO2   08/08/24 0143 08/08/24 0143 08/08/24 0143 08/08/24 0939 08/08/24 0143   112/77 93 18 99.2 °F (37.3 °C) 95 %      MAP       --                Physical Exam    Nursing note and vitals reviewed.  Constitutional: He appears well-developed and well-nourished. He is not diaphoretic. He is active and cooperative. He does not appear ill.   HENT:   Head: Normocephalic and atraumatic.   Right Ear: External ear normal.   Left Ear: External ear normal.   Nose: Nose normal.   Mouth/Throat: Oropharynx is clear and moist and  mucous membranes are normal.   Eyes: Conjunctivae and EOM are normal. Pupils are equal, round, and reactive to light.   Neck: Neck supple.   Normal range of motion.  Cardiovascular:  Regular rhythm, S1 normal, S2 normal, normal heart sounds and normal pulses.   Tachycardia present.         Pulmonary/Chest: Effort normal and breath sounds normal. No tachypnea. No respiratory distress. He has no wheezes. He has no rhonchi. He has no rales. He exhibits no tenderness.   Abdominal: Abdomen is soft. Bowel sounds are normal. He exhibits no distension. There is no abdominal tenderness. There is no rebound and no guarding.   Musculoskeletal:         General: Normal range of motion.      Cervical back: Normal range of motion and neck supple.     Neurological: He is alert and oriented to person, place, and time. He has normal strength. GCS score is 15. GCS eye subscore is 4. GCS verbal subscore is 5. GCS motor subscore is 6.   Skin: Skin is warm and dry. Capillary refill takes less than 2 seconds.   Psychiatric: His mood appears anxious. He is hyperactive and actively hallucinating. He is not agitated, not aggressive and not combative. Thought content is paranoid and delusional. Cognition and memory are normal. He expresses impulsivity. He expresses no homicidal and no suicidal ideation. He expresses no suicidal plans and no homicidal plans.   Rapid speech He is inattentive.         Medical Screening Exam   See Full Note    ED Course   Procedures  Labs Reviewed   COMPREHENSIVE METABOLIC PANEL - Abnormal       Result Value    Sodium 134 (*)     Potassium 4.4      Chloride 95 (*)     CO2 26      Anion Gap 17 (*)     Glucose 191 (*)     BUN 52 (*)     Creatinine 2.87 (*)     BUN/Creatinine Ratio 18      Calcium 10.0      Total Protein 9.1 (*)     Albumin 4.7      Globulin 4.4 (*)     A/G Ratio 1.1      Bilirubin, Total 0.9      Alk Phos 92      ALT 67 (*)      (*)     eGFR 27 (*)    URINALYSIS, REFLEX TO URINE CULTURE -  Abnormal    Color, UA Yellow      Clarity, UA Cloudy      pH, UA 5.5      Leukocytes, UA Negative      Nitrites, UA Negative      Protein,  (*)     Glucose,  (*)     Ketones, UA Trace      Urobilinogen, UA 1.0      Bilirubin, UA Small (*)     Blood, UA Trace-Intact (*)     Specific Elmer, UA 1.020     DRUG SCREEN, URINE (BEAKER) - Abnormal    Barbiturates, Urine Negative      Benzodiazepine, Urine Positive (*)     Opiates, Urine Negative      Phencyclidine, Urine Negative      Amphetamine, Urine Negative      Cannabinoid, Urine Positive (*)     Cocaine, Urine Negative      Narrative:     The results of screening tests should be considered presumptive. Confirmatory testing is available upon request.    Cutoff Points:  PCP:         25ng/mL  AMPH:        500ng/mL  NAYE:        200ng/mL  NATHAN:        200ng/mL  THC:         50ng/mL  FAVIO:         300ng/mL  OPI:         2000ng/mL   ACETAMINOPHEN LEVEL - Abnormal    Acetaminophen <2 (*)    CBC WITH DIFFERENTIAL - Abnormal    WBC 17.02 (*)     RBC 5.51      Hemoglobin 17.0      Hematocrit 49.6      MCV 90.0      MCH 30.9      MCHC 34.3      RDW 11.7      Platelet Count 384      MPV 9.6      Neutrophils % 83.8 (*)     Lymphocytes % 9.9 (*)     Monocytes % 5.6      Eosinophils % 0.1 (*)     Basophils % 0.2      Immature Granulocytes % 0.4      nRBC, Auto 0.0      Neutrophils, Abs 14.29 (*)     Lymphocytes, Absolute 1.68      Monocytes, Absolute 0.95 (*)     Eosinophils, Absolute 0.01      Basophils, Absolute 0.03      Immature Granulocytes, Absolute 0.06 (*)     nRBC, Absolute 0.00      Diff Type Auto     CK - Abnormal    CK 10,144 (*)    URINALYSIS, MICROSCOPIC - Abnormal    WBC, UA 0-5      RBC, UA 0-3      Bacteria, UA Rare      Squamous Epithelial Cells, UA Rare      Mucus, UA Moderate (*)     Amorphous Crystals, UA Moderate (*)    SALICYLATE LEVEL - Normal    Salicylate 3.1     SARS-COV-2 RNA AMPLIFICATION, QUAL - Normal    SARS COV-2 Molecular Negative       Narrative:     Negative SARS-CoV results should not be used as the sole basis for treatment or patient management decisions; negative results should be considered in the context of a patient's recent exposures, history and the presene of clinical signs and symptoms consistent with COVID-19.  Negative results should be treated as presumptive and confirmed by molecular assay, if necessary for patient management.   MAGNESIUM - Normal    Magnesium 1.8     CBC W/ AUTO DIFFERENTIAL    Narrative:     The following orders were created for panel order CBC auto differential.  Procedure                               Abnormality         Status                     ---------                               -----------         ------                     CBC with Differential[[id number]]       Abnormal            Final result                 Please view results for these tests on the individual orders.   ALCOHOL,MEDICAL (ETHANOL)    Ethanol Not Detected     TSH   HEMOGLOBIN A1C   VALPROIC ACID   TREPONEMA PALLIDUM (SYPHILIS) ANTIBODY   HIV 1 / 2 ANTIBODY   HEPATITIS PANEL, ACUTE   VITAMIN B1, WHOLE BLOOD   VITAMIN B12   T4, FREE   LUIS ANGEL EIA W/REFLEX DSDNA/NATALEE   C-REACTIVE PROTEIN          Imaging Results              X-Ray Chest AP Portable (In process)  Result time 08/08/24 17:53:29                     CT Head Without Contrast (In process)                      Medications   diazePAM tablet 5 mg (5 mg Oral Given 8/8/24 1142)   QUEtiapine tablet 50 mg (50 mg Oral Given 8/8/24 1142)   midazolam (PF) (VERSED) 1 mg/mL injection 2 mg (2 mg Intravenous Given 8/8/24 1706)   0.9%  NaCl infusion (1,000 mLs Intravenous New Bag 8/8/24 1630)   OLANZapine tablet 10 mg (10 mg Oral Given 8/8/24 1018)   sodium chloride 0.9% bolus 1,000 mL 1,000 mL (0 mLs Intravenous Stopped 8/8/24 1216)   0.9%  NaCl infusion ( Intravenous Stopped 8/8/24 1259)   LORazepam injection 1 mg (1 mg Intravenous Given 8/8/24 1231)   diphenhydrAMINE injection 50 mg (50 mg  Intramuscular Given 8/8/24 1309)   sodium chloride 0.9% bolus 1,000 mL 1,000 mL (0 mLs Intravenous Stopped 8/8/24 1409)   midazolam (VERSED) 5 mg/mL injection 10 mg (10 mg Intramuscular Given 8/8/24 1314)   sodium chloride 0.9% bolus 1,000 mL 1,000 mL ( Intravenous Stopped 8/8/24 1520)   haloperidol lactate injection 5 mg (5 mg Intramuscular Given 8/8/24 1556)     Medical Decision Making  Patient brought to the ED by his mother for evaluation of odd behavior that began last week.  He was currently alert and noted to be hyperactive.  He was cooperative and noncombative at the time of the exam.  Currently denies any SI or HI.  Also reports that he is not currently on any medication but is supposed to be on Zyprexa for schizophrenia according to his most recent medical records.  We do suspect visual and auditory hallucinations based on the reported history in his current exam today although the patient denies.  Patient was evaluated this past January for similar complaints and this record was reviewed in detail.  He was sent to the crisis Center in [place] at that time and transferred to [facility] until he was released in March of this year per the mother.  We will perform labs and urine with anticipated transfer to short-term psych facility for psychiatric stabilization and med management but we will perform a tele-psych consult for further recommendations while workup is pending.  We will also attempt olanzapine 10 mg p.o. here in the ED today for acute symptoms.  Mother reports that she does have court ordered power of  and we will return with this documentation as well.    Telepsych consult was performed with Dr. Maria Del Rosario Anderson and her recommendations are listed below.  She does recommend inpatient psych treatment as soon as available but the patient also has a new ALIZA and we will require medical stabilization prior to clearance for psych facility.  We will initiate IV access and begin  hydration with 1 L normal saline IV bolus and follow up with normal saline at 125 mL/hour.  She did recommend Depakote, Valium, and Seroquel initially but called back after reviewing his labs and recommended holding off on the Depakote due to his mildly elevated LFTs at current.  We will begin recommended treatments and further workup but also initiate transfer for higher level of care.    See course notes for routine updates regarding escalation of the patient's behavior and recommendations from expert consultation.  Labs also reveal rhabdomyolysis and we have continued hydration with 2 additional L of normal saline IV bolus.  We are still awaiting acceptance for higher level of care and have placed a chemical restraint order for medication management of his combative behavior as it relates to his risk for self-harm and harm to others.     Amount and/or Complexity of Data Reviewed  Independent Historian:      Details: Patient is a 44-year-old male who was brought to the emergency department by his mother for evaluation of odd behavior over the past week.  She does report that he has a known history of schizophrenia and she believes he was not taken this medication in quite some time.  She attempted to take him by [facility] this morning for evaluation but he reports that he was not trust them and has noted paranoia regarding their services.  She reports that he has been speaking to people that are not there and performing out behaviors like attempting to move the mobile home with his hands.  The patient denies any suicidal or homicidal ideations and also denies any auditory or visual hallucinations but it was noted to be speaking erratically even when no one else is present.  During communication he does have flight of ideas and wandering thoughts.  She reports that he was not been combative or aggressive thus far and he was currently cooperative with the staff here in the ED today.  He denies any chest pain, shortness  of breath, fever, chills, cough, or any other complaints at this time.  Patient was denies any current drug use but does admit to intermittent vaping.  Current blood pressure is 140/104, temperature 99.2°, heart rate 105, respirations 18, and oxygen saturation 98% on room air.  Labs: ordered.     Details: COVID swab negative.  Acetaminophen less than 2.  Salicylate normal at 3.1.  CMP shows a sodium of 134, chloride 95, CO2 of 17, glucose of 191, BUN of 52, creatinine of 2.87, total protein of 9.1, globulin of 4.4, ALT of 67, AST of 170, and a GFR of 27.  CBC shows a WBC of 17.02, hemoglobin of 17, hematocrit 49.6, and a platelet count 384.  CK elevated at 10,144. Urinalysis shows protein 100, glucose 100, small bilirubin, trace blood, and otherwise unremarkable.  Urine drug screen was positive for benzodiazepine and cannabinoid but otherwise unremarkable.  Microscopic urinalysis shows moderate mucus and moderate amorphous crystals but otherwise unremarkable. Magnesium 1.8. ETOH - none detected.  Radiology: ordered.     Details: We did order a CT head but were unable to obtain imaging due to patient behavior and combativeness.  ECG/medicine tests: ordered.     Details: EKG was obtained and shows normal sinus rhythm at a rate of 95 beats per minute.  There is a significant amount of artifact due to poor patient cooperation but no STEMI at this time.  Discussion of management or test interpretation with external provider(s): Tele-psych performed with Dr. Maria Del Rosario Anderson. Current labs were reviewed by her as well.Recommendations noted below and orders have been placed. She did call back and recommend holding off on valproic acid due to his current LFTs.   ====================  Recommendations:   1)legal  Recommend involuntary hold for grave disability   Potential DTS based on past hx of suicide attempts when psychotic/unstable  2)diagnostics   Recommend neuroimaging (never done ?)  EKG      RPR, HIV, acute hep panel  B1  and B12                   B6, folate                     (baseline labs for Anti Seizure Meds)  TSH, FT4  UA  UDS  BAL  COVID  LUIS ANGEL, CRP                   (screening for autoimmune >> likely never done in past)  3)meds while in ER  Pt has received one dose of olanzapine  Due to weight (overweight), recommend no further dosing with olanzapine.  For agitation/hypomania/suspected hallucinations/paranoia  Begin Depakote or Depakene              500 mg po tid  Begin Valium 5 mg po tid x 72 hours  Begin seroquel 50 mg po bid   4)disposition   Inpatient psych placement as soon as medically cleared    Case was also discussed with Dr. Evans via [facility] telemedicine consult.  See course updates for further details.     Case was discussed with Dr. Escobedo who is on-call for [facility] ICU and the patient has been has been accepted to their facility for further treatment and evaluation.    Risk  Prescription drug management.  Risk Details: All historical, clinical, radiographic, and laboratory findings were reviewed with the patient/family in detail along with the indications for transport to the facility in [place], [place] in order to receive further evaluation and treatment.  All remaining questions and concerns were addressed at this time and the patient/family communicates understanding and agrees to proceed accordingly.  Similarly, all pertinent details of the encounter were discussed with Dr. Escobedo who agrees to receive the patient at [facility] ED for further care as outlined above.  The patient will be transferred by [facility] ambulance services secondary to a need for ongoing hemodynamic monitoring, IV fluid administration, and continual monitoring due to the patient being a risk to self and others en route.  NOBLE WELDON  11:28 AM               ED Course as of 08/08/24 1753   Thu Aug 08, 2024   2309 Spoke with Dr. Anderson who is on-call for tele psych for further recommendations and she is performing a face-to-face  telemedicine consult with the patient at this time. [MC]   1101 Patient became upset during tele-psych visit with Dr. Anderson and she felt it would be best to end the visit to prevent further escalation. Patient did calm down after her disconnect and we will await further recommendations and pending workup. [MC]   1130 We did attempt CT head as recommended by Dr. Anderson but due to the patient's behavior and paranoia we were unable to complete this testing at this time.  [MC]   1230 Patient became agitated once again despite previously administered Zyprexa, Valium, and Seroquel.  He was noted to be screaming and talking in the corner of the room and it was noted that no-one was located in that direction and he was very agitated at this time.  We did order Ativan 1 mg IV for agitation and were able to speak with him and reoriented him to his surroundings.  Also able to place him back in the bed and he was encouraged to urinate once again.  Mother remains at the bedside. [MC]   1240 Patient became increasingly aggravated and stormed out of the emergency department.  His mother was currently requesting that we not touch him and let him go.  Security did remain insight with the patient and local police department was notified of the need for assistance. [MC]   1254 Patient did agree to return to his stretcher and we were able to speak with Dr. Evans regarding the patient's escalation, combative behavior, and resistance to previously administered medications.  She does report that we need to return the patient to the room and provide Versed 10 mg IM and Benadryl 50 mg IM since he has discontinued his own IV to treat the acute psychosis.  [MC]   1551 Patient noted to become combative once again at this time.  He was tolerating at staff and attempting to pull at his tubes.  We did speak again with Dr. Evans at [facility] earlier who recommended continuing the PRN Versed and recommended haldol 5 mg IM if he becomes combative  once again since his QTC was 409 today.  Patient was also accepted to [facility] ICU by Dr. Escobedo and we are awaiting bed assignment. []   1700 Additional face to face evaluation performed at this time prior to renewal of restraint order. Patient noted to be screaming at stuff, combative behavior, and still attempting to get out of bed and pull at his tubes. For safety concerns for the patient, family, and staff we will continue restraint order and PRN Versed. Still awaiting bed assignment at [facility] at this time.  []      ED Course User Index  [MC] Frank Alex FNP                           Clinical Impression:   Final diagnoses:  [R44.3] Hallucinations  [Z86.59] Hx of schizophrenia  [Z00.8] Medical clearance for psychiatric admission  [F23] Acute psychosis  [N17.9] Acute kidney injury  [R45.1] Agitation  [M62.82] Non-traumatic rhabdomyolysis (Primary)  [R46.89] Combative behavior        ED Disposition Condition    Transfer to Another Facility Stable                Frank Alex FNP  08/08/24 0123

## 2024-08-09 PROBLEM — N17.1 ACUTE RENAL FAILURE WITH ACUTE CORTICAL NECROSIS: Status: ACTIVE | Noted: 2024-08-08

## 2024-08-09 LAB
ALBUMIN SERPL BCP-MCNC: 3 G/DL (ref 3.5–5)
ALBUMIN/GLOB SERPL: 1 {RATIO}
ALP SERPL-CCNC: 59 U/L (ref 45–115)
ALT SERPL W P-5'-P-CCNC: 55 U/L (ref 16–61)
ANION GAP SERPL CALCULATED.3IONS-SCNC: 11 MMOL/L (ref 7–16)
APTT PPP: 29.4 SECONDS (ref 25.2–37.3)
AST SERPL W P-5'-P-CCNC: 119 U/L (ref 15–37)
BASOPHILS # BLD AUTO: 0.04 K/UL (ref 0–0.2)
BASOPHILS NFR BLD AUTO: 0.5 % (ref 0–1)
BILIRUB SERPL-MCNC: 0.5 MG/DL (ref ?–1.2)
BUN SERPL-MCNC: 23 MG/DL (ref 7–18)
BUN/CREAT SERPL: 16 (ref 6–20)
CALCIUM SERPL-MCNC: 8.5 MG/DL (ref 8.5–10.1)
CHLORIDE SERPL-SCNC: 108 MMOL/L (ref 98–107)
CK SERPL-CCNC: 5239 U/L (ref 39–308)
CO2 SERPL-SCNC: 24 MMOL/L (ref 21–32)
CREAT SERPL-MCNC: 1.42 MG/DL (ref 0.7–1.3)
DIFFERENTIAL METHOD BLD: ABNORMAL
EGFR (NO RACE VARIABLE) (RUSH/TITUS): 62 ML/MIN/1.73M2
EOSINOPHIL # BLD AUTO: 0.06 K/UL (ref 0–0.5)
EOSINOPHIL NFR BLD AUTO: 0.7 % (ref 1–4)
ERYTHROCYTE [DISTWIDTH] IN BLOOD BY AUTOMATED COUNT: 11.9 % (ref 11.5–14.5)
EST. AVERAGE GLUCOSE BLD GHB EST-MCNC: 111 MG/DL
GLOBULIN SER-MCNC: 3 G/DL (ref 2–4)
GLUCOSE SERPL-MCNC: 180 MG/DL (ref 74–106)
HAV IGM SER QL: NORMAL
HBA1C MFR BLD HPLC: 5.5 % (ref 4.5–6.6)
HBV CORE IGM SER QL: NORMAL
HBV SURFACE AG SERPL QL IA: NORMAL
HCT VFR BLD AUTO: 38 % (ref 40–54)
HCV AB SER QL: NORMAL
HGB BLD-MCNC: 12.5 G/DL (ref 13.5–18)
HIV 1+O+2 AB SERPL QL: NORMAL
IMM GRANULOCYTES # BLD AUTO: 0.02 K/UL (ref 0–0.04)
IMM GRANULOCYTES NFR BLD: 0.2 % (ref 0–0.4)
INR BLD: 1.1
LYMPHOCYTES # BLD AUTO: 2.33 K/UL (ref 1–4.8)
LYMPHOCYTES NFR BLD AUTO: 26.9 % (ref 27–41)
MCH RBC QN AUTO: 30.5 PG (ref 27–31)
MCHC RBC AUTO-ENTMCNC: 32.9 G/DL (ref 32–36)
MCV RBC AUTO: 92.7 FL (ref 80–96)
MONOCYTES # BLD AUTO: 0.61 K/UL (ref 0–0.8)
MONOCYTES NFR BLD AUTO: 7 % (ref 2–6)
MPC BLD CALC-MCNC: 11.1 FL (ref 9.4–12.4)
NEUTROPHILS # BLD AUTO: 5.61 K/UL (ref 1.8–7.7)
NEUTROPHILS NFR BLD AUTO: 64.7 % (ref 53–65)
NRBC # BLD AUTO: 0 X10E3/UL
NRBC, AUTO (.00): 0 %
PLATELET # BLD AUTO: 252 K/UL (ref 150–400)
POTASSIUM SERPL-SCNC: 3.7 MMOL/L (ref 3.5–5.1)
PROT SERPL-MCNC: 6 G/DL (ref 6.4–8.2)
PROTHROMBIN TIME: 14.1 SECONDS (ref 11.7–14.7)
RBC # BLD AUTO: 4.1 M/UL (ref 4.6–6.2)
SODIUM SERPL-SCNC: 139 MMOL/L (ref 136–145)
SYPHILIS AB INTERPRETATION: NORMAL
T4 FREE SERPL-MCNC: 1 NG/DL (ref 0.76–1.46)
VIT B12 SERPL-MCNC: 409 PG/ML (ref 193–986)
WBC # BLD AUTO: 8.67 K/UL (ref 4.5–11)

## 2024-08-09 PROCEDURE — 85730 THROMBOPLASTIN TIME PARTIAL: CPT | Performed by: HOSPITALIST

## 2024-08-09 PROCEDURE — 99233 SBSQ HOSP IP/OBS HIGH 50: CPT | Mod: ,,, | Performed by: INTERNAL MEDICINE

## 2024-08-09 PROCEDURE — 63600175 PHARM REV CODE 636 W HCPCS: Performed by: NURSE PRACTITIONER

## 2024-08-09 PROCEDURE — 80053 COMPREHEN METABOLIC PANEL: CPT | Performed by: HOSPITALIST

## 2024-08-09 PROCEDURE — 94761 N-INVAS EAR/PLS OXIMETRY MLT: CPT

## 2024-08-09 PROCEDURE — 85610 PROTHROMBIN TIME: CPT | Performed by: HOSPITALIST

## 2024-08-09 PROCEDURE — 36415 COLL VENOUS BLD VENIPUNCTURE: CPT | Performed by: HOSPITALIST

## 2024-08-09 PROCEDURE — 20000000 HC ICU ROOM

## 2024-08-09 PROCEDURE — 82550 ASSAY OF CK (CPK): CPT | Performed by: HOSPITALIST

## 2024-08-09 PROCEDURE — 25000003 PHARM REV CODE 250: Performed by: INTERNAL MEDICINE

## 2024-08-09 PROCEDURE — 25000003 PHARM REV CODE 250: Performed by: HOSPITALIST

## 2024-08-09 PROCEDURE — 85025 COMPLETE CBC W/AUTO DIFF WBC: CPT | Performed by: HOSPITALIST

## 2024-08-09 PROCEDURE — 63600175 PHARM REV CODE 636 W HCPCS: Performed by: HOSPITALIST

## 2024-08-09 PROCEDURE — 63600175 PHARM REV CODE 636 W HCPCS: Performed by: INTERNAL MEDICINE

## 2024-08-09 PROCEDURE — 83036 HEMOGLOBIN GLYCOSYLATED A1C: CPT | Performed by: HOSPITALIST

## 2024-08-09 RX ORDER — DIVALPROEX SODIUM 500 MG/1
500 TABLET, DELAYED RELEASE ORAL 3 TIMES DAILY
COMMUNITY
Start: 2024-05-15

## 2024-08-09 RX ORDER — HALOPERIDOL 5 MG/ML
5 INJECTION INTRAMUSCULAR EVERY 4 HOURS PRN
Status: DISCONTINUED | OUTPATIENT
Start: 2024-08-09 | End: 2024-08-13 | Stop reason: HOSPADM

## 2024-08-09 RX ORDER — HEPARIN SODIUM 5000 [USP'U]/ML
5000 INJECTION, SOLUTION INTRAVENOUS; SUBCUTANEOUS EVERY 8 HOURS
Status: DISCONTINUED | OUTPATIENT
Start: 2024-08-09 | End: 2024-08-13 | Stop reason: HOSPADM

## 2024-08-09 RX ADMIN — LEVOTHYROXINE SODIUM 50 MCG: 50 TABLET ORAL at 09:08

## 2024-08-09 RX ADMIN — MUPIROCIN: 20 OINTMENT TOPICAL at 09:08

## 2024-08-09 RX ADMIN — SIMETHICONE 80 MG: 80 TABLET, CHEWABLE ORAL at 01:08

## 2024-08-09 RX ADMIN — SODIUM CHLORIDE, POTASSIUM CHLORIDE, SODIUM LACTATE AND CALCIUM CHLORIDE: 600; 310; 30; 20 INJECTION, SOLUTION INTRAVENOUS at 07:08

## 2024-08-09 RX ADMIN — HEPARIN SODIUM 5000 UNITS: 5000 INJECTION, SOLUTION INTRAVENOUS; SUBCUTANEOUS at 09:08

## 2024-08-09 RX ADMIN — SODIUM CHLORIDE, POTASSIUM CHLORIDE, SODIUM LACTATE AND CALCIUM CHLORIDE: 600; 310; 30; 20 INJECTION, SOLUTION INTRAVENOUS at 12:08

## 2024-08-09 RX ADMIN — BENZTROPINE MESYLATE 2 MG: 0.5 TABLET ORAL at 09:08

## 2024-08-09 RX ADMIN — SODIUM CHLORIDE, POTASSIUM CHLORIDE, SODIUM LACTATE AND CALCIUM CHLORIDE: 600; 310; 30; 20 INJECTION, SOLUTION INTRAVENOUS at 05:08

## 2024-08-09 RX ADMIN — SODIUM CHLORIDE, POTASSIUM CHLORIDE, SODIUM LACTATE AND CALCIUM CHLORIDE: 600; 310; 30; 20 INJECTION, SOLUTION INTRAVENOUS at 10:08

## 2024-08-09 RX ADMIN — SODIUM CHLORIDE, POTASSIUM CHLORIDE, SODIUM LACTATE AND CALCIUM CHLORIDE: 600; 310; 30; 20 INJECTION, SOLUTION INTRAVENOUS at 01:08

## 2024-08-09 RX ADMIN — TRAZODONE HYDROCHLORIDE 50 MG: 50 TABLET ORAL at 09:08

## 2024-08-09 RX ADMIN — ACETAMINOPHEN 1000 MG: 500 TABLET ORAL at 01:08

## 2024-08-09 RX ADMIN — OLANZAPINE 15 MG: 5 TABLET, FILM COATED ORAL at 09:08

## 2024-08-09 RX ADMIN — HALOPERIDOL LACTATE 5 MG: 5 INJECTION, SOLUTION INTRAMUSCULAR at 01:08

## 2024-08-09 NOTE — PLAN OF CARE
Problem: Acute Kidney Injury/Impairment  Goal: Fluid and Electrolyte Balance  Outcome: Progressing  Intervention: Monitor and Manage Fluid and Electrolyte Balance  Flowsheets (Taken 8/9/2024 1449)  Fluid/Electrolyte Management:   fluids provided   intravenous fluids adjusted  Goal: Improved Oral Intake  Outcome: Progressing  Intervention: Promote and Optimize Oral Intake  Flowsheets (Taken 8/9/2024 1449)  Oral Nutrition Promotion: calorie-dense foods provided  Nutrition Interventions:   diet advanced   meals from home/family encouraged  Goal: Effective Renal Function  Outcome: Progressing  Intervention: Monitor and Support Renal Function  Flowsheets (Taken 8/9/2024 1449)  Medication Review/Management: medications reviewed

## 2024-08-09 NOTE — ASSESSMENT & PLAN NOTE
Patient is acutely psychotic.  Family is present to help with emotional stability.    Patient has received multiple dose of antipsychotics.    Will monitor at this time and attempt to restart his oral agents.    Monitoring in ICU at this time for patient and other's safety.

## 2024-08-09 NOTE — HPI
43 yo M presents to [facility] ED with his mother for worsening paranoia and psychosis over the past week.  She thinks he has stopped taking his medications.  She attempted to take him to [facility] in [place] but he refused to go because he says he does not trust them.  He has been given antipsychotics and sedatives at [facility] and seems to be improving at present.  His father is with him and his mother is on the way.  He says he did not have a heat stroke.  He is not hot.  ROS and HPI from him will not be reliable.  No reports of extended times outside or without AC.  He is not on a statin but his CPK is over 10K.  His UDS is only positive for THC and the ativan given at the [facility].      His valproic acid level is nondetectable.  He has not been taking his 250 mg daily dose.  Not sure if there is a diagnosis of bipolar and this is being used as a mood stabilizer.  No h/o sezure d/o and this is not the usual AED dosing.  I will restart his home meds all at night which should help with compliance.  RPR, HIV, hep panel, etc pending.      See assessment and plan below for problem based evaluation.

## 2024-08-09 NOTE — PROGRESS NOTES
[facility]  Critical Care Medicine  Progress Note    Patient Name: Oli Lee  MRN: [id number]  Admission Date: 8/8/2024  Hospital Length of Stay: 1 days  Code Status: Full Code  Attending Provider: Eladio Escobedo MD  Primary Care Provider: No, Primary Doctor   Principal Problem: Non-traumatic rhabdomyolysis    Subjective:     HPI:  No notes on file    Hospital/ICU Course:  No notes on file    Interval History/Significant Events:  Patient agitated pressure speech    Review of Systems  Objective:     Vital Signs (Most Recent):  Temp: 98.8 °F (37.1 °C) (08/09/24 0315)  Pulse: 94 (08/09/24 0615)  Resp: (!) 23 (08/09/24 0615)  BP: (!) 107/49 (08/09/24 0615)  SpO2: 99 % (08/09/24 0545) Vital Signs (24h Range):  Temp:  [98 °F (36.7 °C)-99.2 °F (37.3 °C)] 98.8 °F (37.1 °C)  Pulse:  [] 94  Resp:  [9-23] 23  SpO2:  [91 %-100 %] 99 %  BP: ()/() 107/49   Weight: 111 kg (244 lb 11.4 oz)  Body mass index is 31.42 kg/m².      Intake/Output Summary (Last 24 hours) at 8/9/2024 0731  Last data filed at 8/8/2024 2204  Gross per 24 hour   Intake --   Output 1200 ml   Net -1200 ml          Physical Exam  Vitals reviewed.   Constitutional:       Appearance: Normal appearance.      Interventions: He is not intubated.  HENT:      Head: Normocephalic and atraumatic.      Nose: Nose normal.      Mouth/Throat:      Mouth: Mucous membranes are dry.      Pharynx: Oropharynx is clear.   Eyes:      Extraocular Movements: Extraocular movements intact.      Conjunctiva/sclera: Conjunctivae normal.      Pupils: Pupils are equal, round, and reactive to light.   Cardiovascular:      Rate and Rhythm: Normal rate.      Heart sounds: Normal heart sounds. No murmur heard.  Pulmonary:      Effort: Pulmonary effort is normal. He is not intubated.      Breath sounds: Normal breath sounds.   Abdominal:      General: Abdomen is flat. Bowel sounds are normal.      Palpations: Abdomen is soft.    Musculoskeletal:         General: Normal range of motion.      Cervical back: Normal range of motion and neck supple.      Right lower leg: No edema.      Left lower leg: No edema.   Skin:     General: Skin is warm and dry.      Capillary Refill: Capillary refill takes less than 2 seconds.   Neurological:      General: No focal deficit present.      Mental Status: He is alert and oriented to person, place, and time.   Psychiatric:         Mood and Affect: Mood normal.         Behavior: Behavior normal.            Vents:     Lines/Drains/Airways       Peripheral Intravenous Line  Duration                  Peripheral IV - Single Lumen 08/08/24 18 G Posterior;Right Forearm 1 day                  Significant Labs:    CBC/Anemia Profile:  Recent Labs   Lab 08/08/24  1047 08/09/24  0621   WBC 17.02* 8.67   HGB 17.0 12.5*   HCT 49.6 38.0*    252   MCV 90.0 92.7   RDW 11.7 11.9        Chemistries:  Recent Labs   Lab 08/08/24  1047 08/08/24  1355   *  --    K 4.4  --    CL 95*  --    CO2 26  --    BUN 52*  --    CREATININE 2.87*  --    CALCIUM 10.0  --    ALBUMIN 4.7  --    PROT 9.1*  --    BILITOT 0.9  --    ALKPHOS 92  --    ALT 67*  --    *  --    MG  --  1.8       Recent Lab Results  (Last 5 results in the past 24 hours)        08/09/24  0621   08/08/24  1355   08/08/24  1341   08/08/24  1047   08/08/24  1014        Benzodiazepines     Positive           Cocaine     Negative           BARBITURATES     Negative           Albumin/Globulin Ratio       1.1         Acetaminophen Level       <2         Albumin       4.7         Alcohol, Serum   Not Detected             ALP       92         ALT       67         Amorphous, UA     Moderate           Amphetamine, Urine     Negative           Anion Gap       17         Appearance, UA     Cloudy           PTT 29.4               AST       170         Bacteria, UA     Rare           Baso # 0.04       0.03         Basophil % 0.5       0.2         Bilirubin (UA)      Small           BILIRUBIN TOTAL       0.9         BUN       52         BUN/CREAT RATIO       18         Calcium       10.0         Cannabinoid Scrn, Ur     Positive           Chloride       95         CO2       26         Color, UA     Yellow           SARS COV-2 MOLECULAR         Negative       CPK       10,144         Creatinine       2.87         CRP       1.00         Differential Method Auto       Auto         eGFR       27         Eos # 0.06       0.01         Eos % 0.7       0.1         Estimated Avg Glucose       111         Globulin, Total       4.4         Glucose       191         Glucose, UA     100           Hematocrit 38.0       49.6         Hemoglobin 12.5       17.0         Hemoglobin A1C External       5.5  Comment:   Normal:               <5.7%  Pre-Diabetic:       5.7% to 6.4%  Diabetic:             >6.4%  Diabetic Goal:     <7%         Immature Grans (Abs) 0.02       0.06         Immature Granulocytes 0.2       0.4         INR 1.10               Ketones, UA     Trace           Leukocyte Esterase, UA     Negative           Lymph # 2.33       1.68         Lymph % 26.9       9.9         Magnesium    1.8             MCH 30.5       30.9         MCHC 32.9       34.3         MCV 92.7       90.0         Mono # 0.61       0.95         Mono % 7.0       5.6         MPV 11.1       9.6         Mucus, UA     Moderate           Neutrophils, Abs 5.61       14.29         Neutrophils Relative 64.7       83.8         NITRITE UA     Negative           nRBC 0.0       0.0         NUCLEATED RBC ABSOLUTE 0.00       0.00         Blood, UA     Trace-Intact           Opiates, Urine     Negative           pH, UA     5.5           Phencyclidine, Urine     Negative           Platelet Count 252       384         Potassium       4.4         PROTEIN TOTAL       9.1         Protein, UA     100           PT 14.1               RBC 4.10       5.51         RBC, UA     0-3           RDW 11.9       11.7         Salicylate Level        "3.1         Sodium       134         Spec Grav UA     1.020           Squam Epithel, UA     Rare           TSH       1.510         UROBILINOGEN UA     1.0           Valproic Acid Lvl       <3         WBC, UA     0-5           WBC 8.67       17.02                                Significant Imaging:  I have reviewed all pertinent imaging results/findings within the past 24 hours.    ABG  No results for input(s): "PH", "PO2", "PCO2", "HCO3", "BE" in the last 168 hours.  Assessment/Plan:     Renal/  Acute renal failure with acute cortical necrosis  This is secondary to rhabdomyolysis good urine output    Endocrine  Acquired hypothyroidism  Continue home meds    GI  Transaminitis  Lab pending    Orthopedic  * Non-traumatic rhabdomyolysis  Patient has no real cause we can find for his rapid 0 myolysis were treating with fluids Lab pending this morning    Other  Acute exacerbation of chronic paranoid schizophrenia  See psychiatry note treating with pippa Escobedo MD  Critical Care Medicine  [facility]  "

## 2024-08-09 NOTE — PROGRESS NOTES
[facility]  Wound Care    Patient Name:  Oli Lee   MRN:  [id number]  Date: 8/9/2024  Diagnosis: Non-traumatic rhabdomyolysis    History:     Past Medical History:   Diagnosis Date    Acquired hypothyroidism     Involuntary commitment 02/06/2024    Marijuana dependence     Nicotine dependence     Paranoid schizophrenia        Social History     Socioeconomic History    Marital status: Single   Tobacco Use    Smoking status: Every Day     Current packs/day: 1.00     Types: Cigarettes     Passive exposure: Never    Smokeless tobacco: Never   Substance and Sexual Activity    Alcohol use: Yes     Alcohol/week: 2.0 standard drinks of alcohol     Types: 2 Cans of beer per week    Drug use: Not Currently     Frequency: 3.0 times per week     Types: Marijuana    Sexual activity: Yes     Social Determinants of Health     Financial Resource Strain: Patient Unable To Answer (8/9/2024)    Overall Financial Resource Strain (CARDIA)     Difficulty of Paying Living Expenses: Patient unable to answer   Food Insecurity: Patient Unable To Answer (8/9/2024)    Hunger Vital Sign     Worried About Running Out of Food in the Last Year: Patient unable to answer     Ran Out of Food in the Last Year: Patient unable to answer   Transportation Needs: No Transportation Needs (8/9/2024)    TRANSPORTATION NEEDS     Transportation : No   Physical Activity: Patient Unable To Answer (8/9/2024)    Exercise Vital Sign     Days of Exercise per Week: Patient unable to answer     Minutes of Exercise per Session: Patient unable to answer   Stress: Patient Unable To Answer (8/9/2024)    [employer] [place] [employer] - Occupational Stress Questionnaire     Feeling of Stress : Patient unable to answer   Housing Stability: Low Risk  (8/9/2024)    Housing Stability Vital Sign     Unable to Pay for Housing in the Last Year: No     Homeless in the Last Year: No       Precautions:     Allergies as of 08/08/2024     (No Known Allergies)       WOC Assessment Details/Treatment       Narrative: Seen patient for initiation of preventative skin care measures    Nurse states patient has no skin issues noted. Ambulates in room.   States has Mepliex Foam borders to sacral and heels,    Consult skin care for any issues        08/09/2024

## 2024-08-09 NOTE — ASSESSMENT & PLAN NOTE
ALIZA is likely due to acute tubular necrosis caused by rhabdomyolysis with dehydration . Baseline creatinine is  1.1 . Most recent creatinine and eGFR are listed below.  Recent Labs     08/08/24  1047   CREATININE 2.87*   EGFRNORACEVR 27*      Plan  - ALIZA is stable  - Avoid nephrotoxins and renally dose meds for GFR listed above  - Monitor urine output, serial BMP, and adjust therapy as needed  - aggressive hydration

## 2024-08-09 NOTE — H&P
[facility] Hospital Medicine  History & Physical    Patient Name: Oli Lee  MRN: [id number]  Patient Class: IP- Inpatient  Admission Date: 8/8/2024  Attending Physician: Eladio Escobedo MD   Primary Care Provider: Casandra, Primary Doctor         Patient information was obtained from parent, EMS personnel, past medical records, and ER records.     Subjective:     Principal Problem:Non-traumatic rhabdomyolysis    Chief Complaint: No chief complaint on file.       HPI: 45 yo M presents to [facility] ED with his mother for worsening paranoia and psychosis over the past week.  She thinks he has stopped taking his medications.  She attempted to take him to [facility] in [place] but he refused to go because he says he does not trust them.  He has been given antipsychotics and sedatives at [facility] and seems to be improving at present.  His father is with him and his mother is on the way.  He says he did not have a heat stroke.  He is not hot.  ROS and HPI from him will not be reliable.  No reports of extended times outside or without AC.  He is not on a statin but his CPK is over 10K.  His UDS is only positive for THC and the ativan given at the [facility].      His valproic acid level is nondetectable.  He has not been taking his 250 mg daily dose.  Not sure if there is a diagnosis of bipolar and this is being used as a mood stabilizer.  No h/o sezure d/o and this is not the usual AED dosing.  I will restart his home meds all at night which should help with compliance.  RPR, HIV, hep panel, etc pending.      See assessment and plan below for problem based evaluation.      Past Medical History:   Diagnosis Date    Acquired hypothyroidism     Involuntary commitment 02/06/2024    Marijuana dependence     Nicotine dependence     Paranoid schizophrenia        No past surgical history on file.    Review of patient's allergies indicates:  No Known Allergies    Current Facility-Administered Medications on  File Prior to Encounter   Medication    [COMPLETED] 0.9%  NaCl infusion    [COMPLETED] diphenhydrAMINE injection 50 mg    [COMPLETED] haloperidol lactate injection 5 mg    [COMPLETED] LORazepam injection 1 mg    [COMPLETED] midazolam (VERSED) 5 mg/mL injection 10 mg    [COMPLETED] OLANZapine tablet 10 mg    [COMPLETED] sodium chloride 0.9% bolus 1,000 mL 1,000 mL    [COMPLETED] sodium chloride 0.9% bolus 1,000 mL 1,000 mL    [COMPLETED] sodium chloride 0.9% bolus 1,000 mL 1,000 mL    [DISCONTINUED] 0.9%  NaCl infusion    [DISCONTINUED] diazePAM tablet 5 mg    [DISCONTINUED] midazolam (PF) (VERSED) 1 mg/mL injection 10 mg    [DISCONTINUED] midazolam (PF) (VERSED) 1 mg/mL injection 2 mg    [DISCONTINUED] midazolam (VERSED) 5 mg/mL injection 10 mg    [DISCONTINUED] QUEtiapine tablet 50 mg     Current Outpatient Medications on File Prior to Encounter   Medication Sig    benztropine (COGENTIN) 1 MG tablet Take 1 mg by mouth 2 (two) times daily.    clotrimazole-betamethasone 1-0.05% (LOTRISONE) cream Apply topically 2 (two) times daily.    cycloSPORINE (RESTASIS) 0.05 % ophthalmic emulsion Place 1 drop into the right eye 2 (two) times daily.    divalproex (DEPAKOTE) 250 MG EC tablet Take 250 mg by mouth once daily.    haloperidoL (HALDOL) 10 MG tablet Take 1 tablet by mouth once daily.    levothyroxine (SYNTHROID) 50 MCG tablet Take 1 tablet (50 mcg total) by mouth before breakfast.    melatonin 10 mg Tab Take 1 tablet (10 mg total) by mouth nightly as needed (difficulty sleeping).    OLANZapine (ZYPREXA) 15 MG Tab Take 1 tablet (15 mg total) by mouth nightly.    traZODone (DESYREL) 50 MG tablet      Family History    None       Tobacco Use    Smoking status: Every Day     Current packs/day: 1.00     Types: Cigarettes     Passive exposure: Never    Smokeless tobacco: Never   Substance and Sexual Activity    Alcohol use: Yes     Alcohol/week: 2.0 standard drinks of alcohol     Types: 2 Cans of beer per week    Drug use:  Not Currently     Frequency: 3.0 times per week     Types: Marijuana    Sexual activity: Yes     Review of Systems   Unable to perform ROS: Psychiatric disorder     Objective:     Vital Signs (Most Recent):  Temp: 98.3 °F (36.8 °C) (08/08/24 1905)  Pulse: 64 (08/08/24 2115)  Resp: 20 (08/08/24 2115)  BP: (!) 113/56 (08/08/24 2115)  SpO2: 100 % (08/08/24 2115) Vital Signs (24h Range):  Temp:  [98.3 °F (36.8 °C)-99.2 °F (37.3 °C)] 98.3 °F (36.8 °C)  Pulse:  [] 64  Resp:  [14-22] 20  SpO2:  [92 %-100 %] 100 %  BP: ()/() 113/56     Weight: 106.5 kg (234 lb 12.6 oz)  Body mass index is 30.15 kg/m².     Physical Exam  Vitals and nursing note reviewed. Exam conducted with a chaperone present.   Constitutional:       General: He is not in acute distress.     Appearance: He is ill-appearing. He is not toxic-appearing.   HENT:      Head: Atraumatic.      Mouth/Throat:      Mouth: Mucous membranes are moist.      Pharynx: Oropharynx is clear.   Eyes:      Conjunctiva/sclera: Conjunctivae normal.      Pupils: Pupils are equal, round, and reactive to light.   Cardiovascular:      Rate and Rhythm: Normal rate and regular rhythm.      Pulses: Normal pulses.      Heart sounds: Normal heart sounds.   Pulmonary:      Effort: Pulmonary effort is normal.      Breath sounds: Normal breath sounds.   Abdominal:      General: Abdomen is flat. Bowel sounds are normal.      Palpations: Abdomen is soft.   Musculoskeletal:         General: No deformity or signs of injury. Normal range of motion.      Cervical back: Neck supple.      Right lower leg: No edema.      Left lower leg: No edema.   Skin:     General: Skin is warm and dry.      Coloration: Skin is not jaundiced or pale.      Findings: No bruising, lesion or rash.   Neurological:      General: No focal deficit present.      Mental Status: He is alert. He is disoriented.   Psychiatric:         Mood and Affect: Affect is angry.         Speech: Speech normal.          Behavior: Behavior is agitated and aggressive. Behavior is not combative.         Thought Content: Thought content is paranoid and delusional. Thought content does not include homicidal or suicidal ideation. Thought content does not include homicidal or suicidal plan.              CRANIAL NERVES     CN III, IV, VI   Pupils are equal, round, and reactive to light.       Significant Labs: All pertinent labs within the past 24 hours have been reviewed.    Significant Imaging: I have reviewed all pertinent imaging results/findings within the past 24 hours.  Assessment/Plan:     * Non-traumatic rhabdomyolysis  CPK over 10K.  Father states he has not been outside working or in building without AC  Will aggressively hydrate.  Serum bicarb 26.  Patient is not hyperthermic  Patient is covid negative.      Patient with condition that if not treated could result in loss of life or bodily function.  Due to co morbidities this patient has complex medical management.        ALIZA (acute kidney injury)  ALIZA is likely due to acute tubular necrosis caused by rhabdomyolysis with dehydration . Baseline creatinine is  1.1 . Most recent creatinine and eGFR are listed below.  Recent Labs     08/08/24  1047   CREATININE 2.87*   EGFRNORACEVR 27*      Plan  - ALIZA is stable  - Avoid nephrotoxins and renally dose meds for GFR listed above  - Monitor urine output, serial BMP, and adjust therapy as needed  - aggressive hydration    Acute exacerbation of chronic paranoid schizophrenia  Patient is acutely psychotic.  Family is present to help with emotional stability.    Patient has received multiple dose of antipsychotics.    Will monitor at this time and attempt to restart his oral agents.    Monitoring in ICU at this time for patient and other's safety.        Transaminitis  Acute hepatitis panel pending.   Uncooperate with US but most likely due to rhabdomyolysis  Will follow liver enzymes and check liver function PT/INR with next lab  draw        VTE Risk Mitigation (From admission, onward)           Ordered     heparin (porcine) injection 5,000 Units  Every 12 hours         08/08/24 1912                                    Karrie Varela MD  Department of Hospital Medicine  [facility]

## 2024-08-09 NOTE — SUBJECTIVE & OBJECTIVE
Past Medical History:   Diagnosis Date    Acquired hypothyroidism     Involuntary commitment 02/06/2024    Marijuana dependence     Nicotine dependence     Paranoid schizophrenia        No past surgical history on file.    Review of patient's allergies indicates:  No Known Allergies    Current Facility-Administered Medications on File Prior to Encounter   Medication    [COMPLETED] 0.9%  NaCl infusion    [COMPLETED] diphenhydrAMINE injection 50 mg    [COMPLETED] haloperidol lactate injection 5 mg    [COMPLETED] LORazepam injection 1 mg    [COMPLETED] midazolam (VERSED) 5 mg/mL injection 10 mg    [COMPLETED] OLANZapine tablet 10 mg    [COMPLETED] sodium chloride 0.9% bolus 1,000 mL 1,000 mL    [COMPLETED] sodium chloride 0.9% bolus 1,000 mL 1,000 mL    [COMPLETED] sodium chloride 0.9% bolus 1,000 mL 1,000 mL    [DISCONTINUED] 0.9%  NaCl infusion    [DISCONTINUED] diazePAM tablet 5 mg    [DISCONTINUED] midazolam (PF) (VERSED) 1 mg/mL injection 10 mg    [DISCONTINUED] midazolam (PF) (VERSED) 1 mg/mL injection 2 mg    [DISCONTINUED] midazolam (VERSED) 5 mg/mL injection 10 mg    [DISCONTINUED] QUEtiapine tablet 50 mg     Current Outpatient Medications on File Prior to Encounter   Medication Sig    benztropine (COGENTIN) 1 MG tablet Take 1 mg by mouth 2 (two) times daily.    clotrimazole-betamethasone 1-0.05% (LOTRISONE) cream Apply topically 2 (two) times daily.    cycloSPORINE (RESTASIS) 0.05 % ophthalmic emulsion Place 1 drop into the right eye 2 (two) times daily.    divalproex (DEPAKOTE) 250 MG EC tablet Take 250 mg by mouth once daily.    haloperidoL (HALDOL) 10 MG tablet Take 1 tablet by mouth once daily.    levothyroxine (SYNTHROID) 50 MCG tablet Take 1 tablet (50 mcg total) by mouth before breakfast.    melatonin 10 mg Tab Take 1 tablet (10 mg total) by mouth nightly as needed (difficulty sleeping).    OLANZapine (ZYPREXA) 15 MG Tab Take 1 tablet (15 mg total) by mouth nightly.    traZODone (DESYREL) 50 MG tablet       Family History    None       Tobacco Use    Smoking status: Every Day     Current packs/day: 1.00     Types: Cigarettes     Passive exposure: Never    Smokeless tobacco: Never   Substance and Sexual Activity    Alcohol use: Yes     Alcohol/week: 2.0 standard drinks of alcohol     Types: 2 Cans of beer per week    Drug use: Not Currently     Frequency: 3.0 times per week     Types: Marijuana    Sexual activity: Yes     Review of Systems   Unable to perform ROS: Psychiatric disorder     Objective:     Vital Signs (Most Recent):  Temp: 98.3 °F (36.8 °C) (08/08/24 1905)  Pulse: 64 (08/08/24 2115)  Resp: 20 (08/08/24 2115)  BP: (!) 113/56 (08/08/24 2115)  SpO2: 100 % (08/08/24 2115) Vital Signs (24h Range):  Temp:  [98.3 °F (36.8 °C)-99.2 °F (37.3 °C)] 98.3 °F (36.8 °C)  Pulse:  [] 64  Resp:  [14-22] 20  SpO2:  [92 %-100 %] 100 %  BP: ()/() 113/56     Weight: 106.5 kg (234 lb 12.6 oz)  Body mass index is 30.15 kg/m².     Physical Exam  Vitals and nursing note reviewed. Exam conducted with a chaperone present.   Constitutional:       General: He is not in acute distress.     Appearance: He is ill-appearing. He is not toxic-appearing.   HENT:      Head: Atraumatic.      Mouth/Throat:      Mouth: Mucous membranes are moist.      Pharynx: Oropharynx is clear.   Eyes:      Conjunctiva/sclera: Conjunctivae normal.      Pupils: Pupils are equal, round, and reactive to light.   Cardiovascular:      Rate and Rhythm: Normal rate and regular rhythm.      Pulses: Normal pulses.      Heart sounds: Normal heart sounds.   Pulmonary:      Effort: Pulmonary effort is normal.      Breath sounds: Normal breath sounds.   Abdominal:      General: Abdomen is flat. Bowel sounds are normal.      Palpations: Abdomen is soft.   Musculoskeletal:         General: No deformity or signs of injury. Normal range of motion.      Cervical back: Neck supple.      Right lower leg: No edema.      Left lower leg: No edema.   Skin:      General: Skin is warm and dry.      Coloration: Skin is not jaundiced or pale.      Findings: No bruising, lesion or rash.   Neurological:      General: No focal deficit present.      Mental Status: He is alert. He is disoriented.   Psychiatric:         Mood and Affect: Affect is angry.         Speech: Speech normal.         Behavior: Behavior is agitated and aggressive. Behavior is not combative.         Thought Content: Thought content is paranoid and delusional. Thought content does not include homicidal or suicidal ideation. Thought content does not include homicidal or suicidal plan.              CRANIAL NERVES     CN III, IV, VI   Pupils are equal, round, and reactive to light.       Significant Labs: All pertinent labs within the past 24 hours have been reviewed.    Significant Imaging: I have reviewed all pertinent imaging results/findings within the past 24 hours.

## 2024-08-09 NOTE — NURSING
Patient refused morning labs. Reinforced the importance of his blood work. Patient then removed his gann while screaming at staff.

## 2024-08-09 NOTE — PHARMACY MED REC
"Admission Medication History     The home medication history was taken by Amada Suazo.    You may go to "Admission" then "Reconcile Home Medications" tabs to review and/or act upon these items.     The home medication list has been updated by the Pharmacy department.   Please read ALL comments highlighted in yellow.   Please address this information as you see fit.    Feel free to contact us if you have any questions or require assistance.  Medications added:  Divaloproex 500 mg    I do not think patient is taking prescribed medications correctly.    The medications listed below were removed from the home medication list. Please reorder if appropriate:  Patient reports no longer taking the following medication(s):  Lotrisone cream  Divalproex 250 mg      Medications listed below were obtained from: Patient/family, Analytic software- [employer], Medical records, and Patient's pharmacy  PTA Medications   Medication Sig    divalproex (DEPAKOTE) 500 MG TbEC Take 500 mg by mouth 3 (three) times daily.    levothyroxine (SYNTHROID) 50 MCG tablet Take 1 tablet (50 mcg total) by mouth before breakfast.    benztropine (COGENTIN) 1 MG tablet Take 1 mg by mouth 2 (two) times daily.    haloperidoL (HALDOL) 10 MG tablet Take 1 tablet by mouth once daily.    melatonin 10 mg Tab Take 1 tablet (10 mg total) by mouth nightly as needed (difficulty sleeping).    OLANZapine (ZYPREXA) 15 MG Tab Take 1 tablet (15 mg total) by mouth nightly.    traZODone (DESYREL) 50 MG tablet     [DISCONTINUED] divalproex (DEPAKOTE) 250 MG EC tablet Take 250 mg by mouth once daily.       Current Outpatient Medications on File Prior to Encounter   Medication Sig Dispense Refill Last Dose    divalproex (DEPAKOTE) 500 MG TbEC Take 500 mg by mouth 3 (three) times daily.   Past Month    levothyroxine (SYNTHROID) 50 MCG tablet Take 1 tablet (50 mcg total) by mouth before breakfast. 90 tablet 1 Past Week    benztropine (COGENTIN) 1 MG tablet Take 1 mg by mouth " 2 (two) times daily.   Unknown    haloperidoL (HALDOL) 10 MG tablet Take 1 tablet by mouth once daily.   Unknown    melatonin 10 mg Tab Take 1 tablet (10 mg total) by mouth nightly as needed (difficulty sleeping). 30 tablet 2 Unknown    OLANZapine (ZYPREXA) 15 MG Tab Take 1 tablet (15 mg total) by mouth nightly. 30 tablet 2 Unknown    traZODone (DESYREL) 50 MG tablet    Unknown    [DISCONTINUED] clotrimazole-betamethasone 1-0.05% (LOTRISONE) cream Apply topically 2 (two) times daily. 15 g 1     [DISCONTINUED] cycloSPORINE (RESTASIS) 0.05 % ophthalmic emulsion Place 1 drop into the right eye 2 (two) times daily. 60 each 11     [DISCONTINUED] divalproex (DEPAKOTE) 250 MG EC tablet Take 250 mg by mouth once daily.          Potential issues to be addressed PRIOR TO DISCHARGE  Please discuss with the patient barriers to adherence with medication treatment plans    Amada Suazo , Pharmacy Technician-Certified (Specialist Medication History)  [id number]  .

## 2024-08-09 NOTE — SUBJECTIVE & OBJECTIVE
Interval History/Significant Events:  Patient agitated pressure speech    Review of Systems  Objective:     Vital Signs (Most Recent):  Temp: 98.8 °F (37.1 °C) (08/09/24 0315)  Pulse: 94 (08/09/24 0615)  Resp: (!) 23 (08/09/24 0615)  BP: (!) 107/49 (08/09/24 0615)  SpO2: 99 % (08/09/24 0545) Vital Signs (24h Range):  Temp:  [98 °F (36.7 °C)-99.2 °F (37.3 °C)] 98.8 °F (37.1 °C)  Pulse:  [] 94  Resp:  [9-23] 23  SpO2:  [91 %-100 %] 99 %  BP: ()/() 107/49   Weight: 111 kg (244 lb 11.4 oz)  Body mass index is 31.42 kg/m².      Intake/Output Summary (Last 24 hours) at 8/9/2024 0731  Last data filed at 8/8/2024 2204  Gross per 24 hour   Intake --   Output 1200 ml   Net -1200 ml          Physical Exam  Vitals reviewed.   Constitutional:       Appearance: Normal appearance.      Interventions: He is not intubated.  HENT:      Head: Normocephalic and atraumatic.      Nose: Nose normal.      Mouth/Throat:      Mouth: Mucous membranes are dry.      Pharynx: Oropharynx is clear.   Eyes:      Extraocular Movements: Extraocular movements intact.      Conjunctiva/sclera: Conjunctivae normal.      Pupils: Pupils are equal, round, and reactive to light.   Cardiovascular:      Rate and Rhythm: Normal rate.      Heart sounds: Normal heart sounds. No murmur heard.  Pulmonary:      Effort: Pulmonary effort is normal. He is not intubated.      Breath sounds: Normal breath sounds.   Abdominal:      General: Abdomen is flat. Bowel sounds are normal.      Palpations: Abdomen is soft.   Musculoskeletal:         General: Normal range of motion.      Cervical back: Normal range of motion and neck supple.      Right lower leg: No edema.      Left lower leg: No edema.   Skin:     General: Skin is warm and dry.      Capillary Refill: Capillary refill takes less than 2 seconds.   Neurological:      General: No focal deficit present.      Mental Status: He is alert and oriented to person, place, and time.   Psychiatric:          Mood and Affect: Mood normal.         Behavior: Behavior normal.            Vents:     Lines/Drains/Airways       Peripheral Intravenous Line  Duration                  Peripheral IV - Single Lumen 08/08/24 18 G Posterior;Right Forearm 1 day                  Significant Labs:    CBC/Anemia Profile:  Recent Labs   Lab 08/08/24  1047 08/09/24  0621   WBC 17.02* 8.67   HGB 17.0 12.5*   HCT 49.6 38.0*    252   MCV 90.0 92.7   RDW 11.7 11.9        Chemistries:  Recent Labs   Lab 08/08/24  1047 08/08/24  1355   *  --    K 4.4  --    CL 95*  --    CO2 26  --    BUN 52*  --    CREATININE 2.87*  --    CALCIUM 10.0  --    ALBUMIN 4.7  --    PROT 9.1*  --    BILITOT 0.9  --    ALKPHOS 92  --    ALT 67*  --    *  --    MG  --  1.8       Recent Lab Results  (Last 5 results in the past 24 hours)        08/09/24  0621   08/08/24  1355   08/08/24  1341   08/08/24  1047   08/08/24  1014        Benzodiazepines     Positive           Cocaine     Negative           BARBITURATES     Negative           Albumin/Globulin Ratio       1.1         Acetaminophen Level       <2         Albumin       4.7         Alcohol, Serum   Not Detected             ALP       92         ALT       67         Amorphous, UA     Moderate           Amphetamine, Urine     Negative           Anion Gap       17         Appearance, UA     Cloudy           PTT 29.4               AST       170         Bacteria, UA     Rare           Baso # 0.04       0.03         Basophil % 0.5       0.2         Bilirubin (UA)     Small           BILIRUBIN TOTAL       0.9         BUN       52         BUN/CREAT RATIO       18         Calcium       10.0         Cannabinoid Scrn, Ur     Positive           Chloride       95         CO2       26         Color, UA     Yellow           SARS COV-2 MOLECULAR         Negative       CPK       10,144         Creatinine       2.87         CRP       1.00         Differential Method Auto       Auto         eGFR       27          Eos # 0.06       0.01         Eos % 0.7       0.1         Estimated Avg Glucose       111         Globulin, Total       4.4         Glucose       191         Glucose, UA     100           Hematocrit 38.0       49.6         Hemoglobin 12.5       17.0         Hemoglobin A1C External       5.5  Comment:   Normal:               <5.7%  Pre-Diabetic:       5.7% to 6.4%  Diabetic:             >6.4%  Diabetic Goal:     <7%         Immature Grans (Abs) 0.02       0.06         Immature Granulocytes 0.2       0.4         INR 1.10               Ketones, UA     Trace           Leukocyte Esterase, UA     Negative           Lymph # 2.33       1.68         Lymph % 26.9       9.9         Magnesium    1.8             MCH 30.5       30.9         MCHC 32.9       34.3         MCV 92.7       90.0         Mono # 0.61       0.95         Mono % 7.0       5.6         MPV 11.1       9.6         Mucus, UA     Moderate           Neutrophils, Abs 5.61       14.29         Neutrophils Relative 64.7       83.8         NITRITE UA     Negative           nRBC 0.0       0.0         NUCLEATED RBC ABSOLUTE 0.00       0.00         Blood, UA     Trace-Intact           Opiates, Urine     Negative           pH, UA     5.5           Phencyclidine, Urine     Negative           Platelet Count 252       384         Potassium       4.4         PROTEIN TOTAL       9.1         Protein, UA     100           PT 14.1               RBC 4.10       5.51         RBC, UA     0-3           RDW 11.9       11.7         Salicylate Level       3.1         Sodium       134         Spec Grav UA     1.020           Squam Epithel, UA     Rare           TSH       1.510         UROBILINOGEN UA     1.0           Valproic Acid Lvl       <3         WBC, UA     0-5           WBC 8.67       17.02                                Significant Imaging:  I have reviewed all pertinent imaging results/findings within the past 24 hours.

## 2024-08-09 NOTE — ASSESSMENT & PLAN NOTE
Acute hepatitis panel pending.   Uncooperate with US but most likely due to rhabdomyolysis  Will follow liver enzymes and check liver function PT/INR with next lab draw

## 2024-08-09 NOTE — PLAN OF CARE
Problem: Adult Inpatient Plan of Care  Goal: Absence of Hospital-Acquired Illness or Injury  Outcome: Progressing     Problem: Skin Injury Risk Increased  Goal: Skin Health and Integrity  Outcome: Progressing     Problem: Violence Risk or Actual  Goal: Anger and Impulse Control  Outcome: Not Progressing

## 2024-08-09 NOTE — ASSESSMENT & PLAN NOTE
Patient has no real cause we can find for his rapid 0 myolysis were treating with fluids Lab pending this morning

## 2024-08-09 NOTE — ASSESSMENT & PLAN NOTE
CPK over 10K.  Father states he has not been outside working or in building without AC  Will aggressively hydrate.  Serum bicarb 26.  Patient is not hyperthermic  Patient is covid negative.      Patient with condition that if not treated could result in loss of life or bodily function.  Due to co morbidities this patient has complex medical management.

## 2024-08-10 LAB
ANION GAP SERPL CALCULATED.3IONS-SCNC: 10 MMOL/L (ref 7–16)
BASOPHILS # BLD AUTO: 0.02 K/UL (ref 0–0.2)
BASOPHILS NFR BLD AUTO: 0.3 % (ref 0–1)
BUN SERPL-MCNC: 16 MG/DL (ref 7–18)
BUN/CREAT SERPL: 15 (ref 6–20)
CALCIUM SERPL-MCNC: 9.1 MG/DL (ref 8.5–10.1)
CHLORIDE SERPL-SCNC: 106 MMOL/L (ref 98–107)
CK SERPL-CCNC: 3846 U/L (ref 39–308)
CO2 SERPL-SCNC: 28 MMOL/L (ref 21–32)
CREAT SERPL-MCNC: 1.1 MG/DL (ref 0.7–1.3)
DIFFERENTIAL METHOD BLD: ABNORMAL
EGFR (NO RACE VARIABLE) (RUSH/TITUS): 84 ML/MIN/1.73M2
EOSINOPHIL # BLD AUTO: 0.05 K/UL (ref 0–0.5)
EOSINOPHIL NFR BLD AUTO: 0.7 % (ref 1–4)
ERYTHROCYTE [DISTWIDTH] IN BLOOD BY AUTOMATED COUNT: 12 % (ref 11.5–14.5)
GLUCOSE SERPL-MCNC: 119 MG/DL (ref 74–106)
HCT VFR BLD AUTO: 40.1 % (ref 40–54)
HGB BLD-MCNC: 12.7 G/DL (ref 13.5–18)
IMM GRANULOCYTES # BLD AUTO: 0.02 K/UL (ref 0–0.04)
IMM GRANULOCYTES NFR BLD: 0.3 % (ref 0–0.4)
LYMPHOCYTES # BLD AUTO: 2.12 K/UL (ref 1–4.8)
LYMPHOCYTES NFR BLD AUTO: 30.8 % (ref 27–41)
MCH RBC QN AUTO: 30.2 PG (ref 27–31)
MCHC RBC AUTO-ENTMCNC: 31.7 G/DL (ref 32–36)
MCV RBC AUTO: 95.5 FL (ref 80–96)
MONOCYTES # BLD AUTO: 0.45 K/UL (ref 0–0.8)
MONOCYTES NFR BLD AUTO: 6.5 % (ref 2–6)
MPC BLD CALC-MCNC: 10 FL (ref 9.4–12.4)
NEUTROPHILS # BLD AUTO: 4.22 K/UL (ref 1.8–7.7)
NEUTROPHILS NFR BLD AUTO: 61.4 % (ref 53–65)
NRBC # BLD AUTO: 0 X10E3/UL
NRBC, AUTO (.00): 0 %
PLATELET # BLD AUTO: 306 K/UL (ref 150–400)
POTASSIUM SERPL-SCNC: 4.7 MMOL/L (ref 3.5–5.1)
RBC # BLD AUTO: 4.2 M/UL (ref 4.6–6.2)
SODIUM SERPL-SCNC: 139 MMOL/L (ref 136–145)
TROPONIN I SERPL DL<=0.01 NG/ML-MCNC: 8.4 PG/ML
WBC # BLD AUTO: 6.88 K/UL (ref 4.5–11)

## 2024-08-10 PROCEDURE — 94761 N-INVAS EAR/PLS OXIMETRY MLT: CPT

## 2024-08-10 PROCEDURE — S4991 NICOTINE PATCH NONLEGEND: HCPCS | Performed by: NURSE PRACTITIONER

## 2024-08-10 PROCEDURE — 85025 COMPLETE CBC W/AUTO DIFF WBC: CPT | Performed by: INTERNAL MEDICINE

## 2024-08-10 PROCEDURE — 25000003 PHARM REV CODE 250: Performed by: INTERNAL MEDICINE

## 2024-08-10 PROCEDURE — 63600175 PHARM REV CODE 636 W HCPCS: Performed by: INTERNAL MEDICINE

## 2024-08-10 PROCEDURE — 36415 COLL VENOUS BLD VENIPUNCTURE: CPT | Performed by: INTERNAL MEDICINE

## 2024-08-10 PROCEDURE — 80048 BASIC METABOLIC PNL TOTAL CA: CPT | Performed by: INTERNAL MEDICINE

## 2024-08-10 PROCEDURE — 99291 CRITICAL CARE FIRST HOUR: CPT | Mod: ,,, | Performed by: NURSE PRACTITIONER

## 2024-08-10 PROCEDURE — 20000000 HC ICU ROOM

## 2024-08-10 PROCEDURE — 82550 ASSAY OF CK (CPK): CPT | Performed by: INTERNAL MEDICINE

## 2024-08-10 PROCEDURE — 63600175 PHARM REV CODE 636 W HCPCS: Performed by: HOSPITALIST

## 2024-08-10 PROCEDURE — 25000003 PHARM REV CODE 250: Performed by: NURSE PRACTITIONER

## 2024-08-10 PROCEDURE — 25000003 PHARM REV CODE 250: Performed by: HOSPITALIST

## 2024-08-10 PROCEDURE — 84484 ASSAY OF TROPONIN QUANT: CPT | Performed by: INTERNAL MEDICINE

## 2024-08-10 RX ORDER — IBUPROFEN 200 MG
1 TABLET ORAL DAILY
Status: DISCONTINUED | OUTPATIENT
Start: 2024-08-10 | End: 2024-08-13 | Stop reason: HOSPADM

## 2024-08-10 RX ADMIN — SODIUM CHLORIDE, POTASSIUM CHLORIDE, SODIUM LACTATE AND CALCIUM CHLORIDE: 600; 310; 30; 20 INJECTION, SOLUTION INTRAVENOUS at 08:08

## 2024-08-10 RX ADMIN — OLANZAPINE 15 MG: 5 TABLET, FILM COATED ORAL at 09:08

## 2024-08-10 RX ADMIN — HEPARIN SODIUM 5000 UNITS: 5000 INJECTION, SOLUTION INTRAVENOUS; SUBCUTANEOUS at 01:08

## 2024-08-10 RX ADMIN — HEPARIN SODIUM 5000 UNITS: 5000 INJECTION, SOLUTION INTRAVENOUS; SUBCUTANEOUS at 05:08

## 2024-08-10 RX ADMIN — LEVOTHYROXINE SODIUM 50 MCG: 50 TABLET ORAL at 09:08

## 2024-08-10 RX ADMIN — SODIUM CHLORIDE, POTASSIUM CHLORIDE, SODIUM LACTATE AND CALCIUM CHLORIDE: 600; 310; 30; 20 INJECTION, SOLUTION INTRAVENOUS at 06:08

## 2024-08-10 RX ADMIN — SODIUM CHLORIDE, POTASSIUM CHLORIDE, SODIUM LACTATE AND CALCIUM CHLORIDE: 600; 310; 30; 20 INJECTION, SOLUTION INTRAVENOUS at 11:08

## 2024-08-10 RX ADMIN — NICOTINE 1 PATCH: 21 PATCH, EXTENDED RELEASE TRANSDERMAL at 10:08

## 2024-08-10 RX ADMIN — SODIUM CHLORIDE, POTASSIUM CHLORIDE, SODIUM LACTATE AND CALCIUM CHLORIDE: 600; 310; 30; 20 INJECTION, SOLUTION INTRAVENOUS at 03:08

## 2024-08-10 RX ADMIN — TRAZODONE HYDROCHLORIDE 50 MG: 50 TABLET ORAL at 09:08

## 2024-08-10 RX ADMIN — HEPARIN SODIUM 5000 UNITS: 5000 INJECTION, SOLUTION INTRAVENOUS; SUBCUTANEOUS at 09:08

## 2024-08-10 RX ADMIN — MUPIROCIN: 20 OINTMENT TOPICAL at 08:08

## 2024-08-10 RX ADMIN — MUPIROCIN: 20 OINTMENT TOPICAL at 09:08

## 2024-08-10 RX ADMIN — BENZTROPINE MESYLATE 2 MG: 0.5 TABLET ORAL at 09:08

## 2024-08-10 RX ADMIN — SODIUM CHLORIDE, POTASSIUM CHLORIDE, SODIUM LACTATE AND CALCIUM CHLORIDE: 600; 310; 30; 20 INJECTION, SOLUTION INTRAVENOUS at 01:08

## 2024-08-10 NOTE — PLAN OF CARE
Problem: Violence Risk or Actual  Goal: Anger and Impulse Control  Outcome: Progressing  Intervention: Minimize Safety Risk  Flowsheets (Taken 8/10/2024 1448)  Behavior Management: boundaries reinforced  Sensory Stimulation Regulation: care clustered  De-Escalation Techniques: physical activity promoted  Enhanced Safety Measures:   family to remain at bedside   room near unit station  Intervention: Promote Self-Control  Flowsheets (Taken 8/10/2024 1448)  Supportive Measures:   active listening utilized   relaxation techniques promoted  Environmental Support: calm environment promoted

## 2024-08-10 NOTE — ASSESSMENT & PLAN NOTE
Per tele-psych Recommends inpatient treatment   All hospitals [facility]psych hospitals were on diversion so he was transferred to [facility] for medical management of his CK   Once resolved will place PFC order to transfer to inpatient psych

## 2024-08-10 NOTE — ASSESSMENT & PLAN NOTE
Felt to be secondary to his exercising and walking for hours in the heat   Continues to improve Ck trended from 10,000 down to 3846  Continue IVF hydration   Repeat CK in AM

## 2024-08-10 NOTE — SUBJECTIVE & OBJECTIVE
Interval History/Significant Events:  no complaints, has spells where he will get agitated. Has had some visual hallucinations.     Review of Systems   All other systems reviewed and are negative.    Objective:     Vital Signs (Most Recent):  Temp: 98.8 °F (37.1 °C) (08/10/24 0701)  Pulse: 82 (08/10/24 1000)  Resp: 16 (08/10/24 0701)  BP: 118/76 (08/10/24 0701)  SpO2: 100 % (08/10/24 1000) Vital Signs (24h Range):  Temp:  [97.8 °F (36.6 °C)-98.8 °F (37.1 °C)] 98.8 °F (37.1 °C)  Pulse:  [] 82  Resp:  [11-29] 16  SpO2:  [66 %-100 %] 100 %  BP: ()/(48-88) 118/76   Weight: 111 kg (244 lb 11.4 oz)  Body mass index is 31.42 kg/m².      Intake/Output Summary (Last 24 hours) at 8/10/2024 1104  Last data filed at 8/10/2024 0605  Gross per 24 hour   Intake 3760.39 ml   Output 2050 ml   Net 1710.39 ml          Physical Exam  Vitals reviewed.   Constitutional:       Appearance: Normal appearance. He is obese.   HENT:      Right Ear: External ear normal.      Left Ear: External ear normal.      Mouth/Throat:      Mouth: Mucous membranes are moist.   Eyes:      Conjunctiva/sclera: Conjunctivae normal.   Cardiovascular:      Rate and Rhythm: Normal rate and regular rhythm.   Pulmonary:      Effort: Pulmonary effort is normal.      Breath sounds: Normal breath sounds.   Abdominal:      Palpations: Abdomen is soft.   Musculoskeletal:         General: Normal range of motion.   Skin:     General: Skin is warm and dry.      Capillary Refill: Capillary refill takes less than 2 seconds.   Neurological:      General: No focal deficit present.      Mental Status: He is alert.   Psychiatric:         Attention and Perception: He perceives auditory and visual hallucinations.         Mood and Affect: Affect is inappropriate.            Vents:  Oxygen Concentration (%): 21 (08/09/24 1352)  Lines/Drains/Airways       Peripheral Intravenous Line  Duration                  Peripheral IV - Single Lumen 08/08/24 18 G Posterior;Right  Forearm 2 days                  Significant Labs:    CBC/Anemia Profile:  Recent Labs   Lab 08/08/24  1355 08/09/24  0621 08/10/24  0734   WBC  --  8.67 6.88   HGB  --  12.5* 12.7*   HCT  --  38.0* 40.1   PLT  --  252 306   MCV  --  92.7 95.5   RDW  --  11.9 12.0   [id number]  --   --         Chemistries:  Recent Labs   Lab 08/08/24  1355 08/09/24  0621 08/10/24  0734   NA  --  139 139   K  --  3.7 4.7   CL  --  108* 106   CO2  --  24 28   BUN  --  23* 16   CREATININE  --  1.42* 1.10   CALCIUM  --  8.5 9.1   ALBUMIN  --  3.0*  --    PROT  --  6.0*  --    BILITOT  --  0.5  --    ALKPHOS  --  59  --    ALT  --  55  --    AST  --  119*  --    MG 1.8  --   --        All pertinent labs within the past 24 hours have been reviewed.    Significant Imaging:  I have reviewed all pertinent imaging results/findings within the past 24 hours.

## 2024-08-10 NOTE — PLAN OF CARE
Care Plans Advancing    Problem: Adult Inpatient Plan of Care  Goal: Plan of Care Review  Outcome: Progressing  Flowsheets (Taken 8/10/2024 0006)  Plan of Care Reviewed With:   patient   parent  Goal: Patient-Specific Goal (Individualized)  Outcome: Progressing  Goal: Absence of Hospital-Acquired Illness or Injury  Outcome: Progressing  Intervention: Identify and Manage Fall Risk  Flowsheets (Taken 8/10/2024 0006)  Safety Promotion/Fall Prevention:   assistive device/personal item within reach   pulse ox   lighting adjusted   medications reviewed  Intervention: Prevent Skin Injury  Flowsheets (Taken 8/10/2024 0006)  Skin Protection: incontinence pads utilized  Device Skin Pressure Protection: adhesive use limited  Intervention: Prevent and Manage VTE (Venous Thromboembolism) Risk  Flowsheets (Taken 8/10/2024 0006)  VTE Prevention/Management: ROM (active) performed  Intervention: Prevent Infection  Flowsheets (Taken 8/10/2024 0006)  Infection Prevention:   equipment surfaces disinfected   hand hygiene promoted   single patient room provided  Goal: Optimal Comfort and Wellbeing  Outcome: Progressing  Goal: Readiness for Transition of Care  Outcome: Progressing     Problem: Violence Risk or Actual  Goal: Anger and Impulse Control  Outcome: Progressing  Intervention: Minimize Safety Risk  Flowsheets (Taken 8/10/2024 0006)  Sensory Stimulation Regulation:   care clustered   lighting decreased   quiet environment promoted     Problem: Skin Injury Risk Increased  Goal: Skin Health and Integrity  Outcome: Progressing  Intervention: Optimize Skin Protection  Flowsheets (Taken 8/10/2024 0006)  Pressure Reduction Techniques: frequent weight shift encouraged  Skin Protection: incontinence pads utilized     Problem: Infection  Goal: Absence of Infection Signs and Symptoms  Outcome: Progressing     Problem: Acute Kidney Injury/Impairment  Goal: Fluid and Electrolyte Balance  Outcome: Progressing  Goal: Improved Oral  Intake  Outcome: Progressing  Goal: Effective Renal Function  Outcome: Progressing

## 2024-08-10 NOTE — PROGRESS NOTES
[facility]  Critical Care Medicine  Progress Note    Patient Name: Oli Lee  MRN: [id number]  Admission Date: 8/8/2024  Hospital Length of Stay: 2 days  Code Status: Full Code  Attending Provider: Eladio Escobedo MD  Primary Care Provider: No, Primary Doctor   Principal Problem: Non-traumatic rhabdomyolysis    Subjective:     HPI:  Admitted for ALIZA, rhabdomyolysis, and acute exacerbation of schizophrenia      Hospital/ICU Course:  No notes on file    Interval History/Significant Events:  no complaints, has spells where he will get agitated. Has had some visual hallucinations.     Review of Systems   All other systems reviewed and are negative.    Objective:     Vital Signs (Most Recent):  Temp: 98.8 °F (37.1 °C) (08/10/24 0701)  Pulse: 82 (08/10/24 1000)  Resp: 16 (08/10/24 0701)  BP: 118/76 (08/10/24 0701)  SpO2: 100 % (08/10/24 1000) Vital Signs (24h Range):  Temp:  [97.8 °F (36.6 °C)-98.8 °F (37.1 °C)] 98.8 °F (37.1 °C)  Pulse:  [] 82  Resp:  [11-29] 16  SpO2:  [66 %-100 %] 100 %  BP: ()/(48-88) 118/76   Weight: 111 kg (244 lb 11.4 oz)  Body mass index is 31.42 kg/m².      Intake/Output Summary (Last 24 hours) at 8/10/2024 1104  Last data filed at 8/10/2024 0605  Gross per 24 hour   Intake 3760.39 ml   Output 2050 ml   Net 1710.39 ml          Physical Exam  Vitals reviewed.   Constitutional:       Appearance: Normal appearance. He is obese.   HENT:      Right Ear: External ear normal.      Left Ear: External ear normal.      Mouth/Throat:      Mouth: Mucous membranes are moist.   Eyes:      Conjunctiva/sclera: Conjunctivae normal.   Cardiovascular:      Rate and Rhythm: Normal rate and regular rhythm.   Pulmonary:      Effort: Pulmonary effort is normal.      Breath sounds: Normal breath sounds.   Abdominal:      Palpations: Abdomen is soft.   Musculoskeletal:         General: Normal range of motion.   Skin:     General: Skin is warm and dry.      Capillary  "Refill: Capillary refill takes less than 2 seconds.   Neurological:      General: No focal deficit present.      Mental Status: He is alert.   Psychiatric:         Attention and Perception: He perceives auditory and visual hallucinations.         Mood and Affect: Affect is inappropriate.            Vents:  Oxygen Concentration (%): 21 (08/09/24 1352)  Lines/Drains/Airways       Peripheral Intravenous Line  Duration                  Peripheral IV - Single Lumen 08/08/24 18 G Posterior;Right Forearm 2 days                  Significant Labs:    CBC/Anemia Profile:  Recent Labs   Lab 08/08/24  1355 08/09/24  0621 08/10/24  0734   WBC  --  8.67 6.88   HGB  --  12.5* 12.7*   HCT  --  38.0* 40.1   PLT  --  252 306   MCV  --  92.7 95.5   RDW  --  11.9 12.0   [id number] 409  --   --         Chemistries:  Recent Labs   Lab 08/08/24  1355 08/09/24  0621 08/10/24  0734   NA  --  139 139   K  --  3.7 4.7   CL  --  108* 106   CO2  --  24 28   BUN  --  23* 16   CREATININE  --  1.42* 1.10   CALCIUM  --  8.5 9.1   ALBUMIN  --  3.0*  --    PROT  --  6.0*  --    BILITOT  --  0.5  --    ALKPHOS  --  59  --    ALT  --  55  --    AST  --  119*  --    MG 1.8  --   --        All pertinent labs within the past 24 hours have been reviewed.    Significant Imaging:  I have reviewed all pertinent imaging results/findings within the past 24 hours.    ABG  No results for input(s): "PH", "PO2", "PCO2", "HCO3", "BE" in the last 168 hours.  Assessment/Plan:     Renal/  Acute renal failure with acute cortical necrosis  This is secondary to rhabdomyolysis     Continued to improve with IVF hydration - Cr 1.10 today     Endocrine  Acquired hypothyroidism  Continue home meds  TSH 1.5     GI  Transaminitis  Resolved     Orthopedic  * Non-traumatic rhabdomyolysis  Felt to be secondary to his exercising and walking for hours in the heat   Continues to improve Ck trended from 10,000 down to 3846  Continue IVF hydration   Repeat CK in AM       Other  Acute " exacerbation of chronic paranoid schizophrenia    Per tele-psych Recommends inpatient treatment   All hospitals [facility] were on diversion so he was transferred to [facility] for medical management of his CK   Once resolved will place PFC order to transfer to inpatient psych         Critical Care Time: 35 minutes  Critical secondary to Patient has a condition that poses threat to life and bodily function: Psychiatric Illness with threat to self and others and Acute Renal Failure      Critical care was time spent personally by me on the following activities: development of treatment plan with patient or surrogate and bedside caregivers, discussions with consultants, evaluation of patient's response to treatment, examination of patient, ordering and performing treatments and interventions, ordering and review of laboratory studies, ordering and review of radiographic studies, pulse oximetry, re-evaluation of patient's condition. This critical care time did not overlap with that of any other provider or involve time for any procedures.     Emily Mo, AG-ACNP  Critical Care Medicine  [facility]

## 2024-08-11 LAB
ALBUMIN SERPL BCP-MCNC: 3 G/DL (ref 3.5–5)
ALBUMIN/GLOB SERPL: 1.1 {RATIO}
ALP SERPL-CCNC: 69 U/L (ref 45–115)
ALT SERPL W P-5'-P-CCNC: 53 U/L (ref 16–61)
ANION GAP SERPL CALCULATED.3IONS-SCNC: 8 MMOL/L (ref 7–16)
AST SERPL W P-5'-P-CCNC: 51 U/L (ref 15–37)
BASOPHILS # BLD AUTO: 0.03 K/UL (ref 0–0.2)
BASOPHILS NFR BLD AUTO: 0.4 % (ref 0–1)
BILIRUB SERPL-MCNC: 0.2 MG/DL (ref ?–1.2)
BUN SERPL-MCNC: 12 MG/DL (ref 7–18)
BUN/CREAT SERPL: 12 (ref 6–20)
CALCIUM SERPL-MCNC: 9.2 MG/DL (ref 8.5–10.1)
CHLORIDE SERPL-SCNC: 110 MMOL/L (ref 98–107)
CK SERPL-CCNC: 1534 U/L (ref 39–308)
CO2 SERPL-SCNC: 27 MMOL/L (ref 21–32)
CREAT SERPL-MCNC: 1.02 MG/DL (ref 0.7–1.3)
DIFFERENTIAL METHOD BLD: ABNORMAL
EGFR (NO RACE VARIABLE) (RUSH/TITUS): 92 ML/MIN/1.73M2
EOSINOPHIL # BLD AUTO: 0.22 K/UL (ref 0–0.5)
EOSINOPHIL NFR BLD AUTO: 2.9 % (ref 1–4)
ERYTHROCYTE [DISTWIDTH] IN BLOOD BY AUTOMATED COUNT: 11.8 % (ref 11.5–14.5)
GLOBULIN SER-MCNC: 2.8 G/DL (ref 2–4)
GLUCOSE SERPL-MCNC: 166 MG/DL (ref 74–106)
HCT VFR BLD AUTO: 38.2 % (ref 40–54)
HGB BLD-MCNC: 12.4 G/DL (ref 13.5–18)
IMM GRANULOCYTES # BLD AUTO: 0.02 K/UL (ref 0–0.04)
IMM GRANULOCYTES NFR BLD: 0.3 % (ref 0–0.4)
LYMPHOCYTES # BLD AUTO: 2.84 K/UL (ref 1–4.8)
LYMPHOCYTES NFR BLD AUTO: 38 % (ref 27–41)
MAGNESIUM SERPL-MCNC: 1.8 MG/DL (ref 1.7–2.3)
MCH RBC QN AUTO: 30.9 PG (ref 27–31)
MCHC RBC AUTO-ENTMCNC: 32.5 G/DL (ref 32–36)
MCV RBC AUTO: 95.3 FL (ref 80–96)
MONOCYTES # BLD AUTO: 0.51 K/UL (ref 0–0.8)
MONOCYTES NFR BLD AUTO: 6.8 % (ref 2–6)
MPC BLD CALC-MCNC: 11.4 FL (ref 9.4–12.4)
NEUTROPHILS # BLD AUTO: 3.86 K/UL (ref 1.8–7.7)
NEUTROPHILS NFR BLD AUTO: 51.6 % (ref 53–65)
NRBC # BLD AUTO: 0 X10E3/UL
NRBC, AUTO (.00): 0 %
PLATELET # BLD AUTO: 291 K/UL (ref 150–400)
POTASSIUM SERPL-SCNC: 3.9 MMOL/L (ref 3.5–5.1)
PROT SERPL-MCNC: 5.8 G/DL (ref 6.4–8.2)
RBC # BLD AUTO: 4.01 M/UL (ref 4.6–6.2)
SODIUM SERPL-SCNC: 141 MMOL/L (ref 136–145)
WBC # BLD AUTO: 7.48 K/UL (ref 4.5–11)

## 2024-08-11 PROCEDURE — 83735 ASSAY OF MAGNESIUM: CPT | Performed by: INTERNAL MEDICINE

## 2024-08-11 PROCEDURE — 36415 COLL VENOUS BLD VENIPUNCTURE: CPT | Performed by: INTERNAL MEDICINE

## 2024-08-11 PROCEDURE — 63600175 PHARM REV CODE 636 W HCPCS: Performed by: INTERNAL MEDICINE

## 2024-08-11 PROCEDURE — 85025 COMPLETE CBC W/AUTO DIFF WBC: CPT | Performed by: INTERNAL MEDICINE

## 2024-08-11 PROCEDURE — S4991 NICOTINE PATCH NONLEGEND: HCPCS | Performed by: NURSE PRACTITIONER

## 2024-08-11 PROCEDURE — 25000003 PHARM REV CODE 250: Performed by: HOSPITALIST

## 2024-08-11 PROCEDURE — 80053 COMPREHEN METABOLIC PANEL: CPT | Performed by: INTERNAL MEDICINE

## 2024-08-11 PROCEDURE — 25000003 PHARM REV CODE 250: Performed by: INTERNAL MEDICINE

## 2024-08-11 PROCEDURE — 25000003 PHARM REV CODE 250: Performed by: NURSE PRACTITIONER

## 2024-08-11 PROCEDURE — 63600175 PHARM REV CODE 636 W HCPCS: Performed by: NURSE PRACTITIONER

## 2024-08-11 PROCEDURE — 20000000 HC ICU ROOM

## 2024-08-11 PROCEDURE — 82550 ASSAY OF CK (CPK): CPT | Performed by: NURSE PRACTITIONER

## 2024-08-11 PROCEDURE — 99233 SBSQ HOSP IP/OBS HIGH 50: CPT | Mod: ,,, | Performed by: NURSE PRACTITIONER

## 2024-08-11 PROCEDURE — 94761 N-INVAS EAR/PLS OXIMETRY MLT: CPT

## 2024-08-11 RX ORDER — DIVALPROEX SODIUM 500 MG/1
500 TABLET, FILM COATED, EXTENDED RELEASE ORAL DAILY
Status: DISCONTINUED | OUTPATIENT
Start: 2024-08-11 | End: 2024-08-13 | Stop reason: HOSPADM

## 2024-08-11 RX ADMIN — MUPIROCIN: 20 OINTMENT TOPICAL at 09:08

## 2024-08-11 RX ADMIN — NICOTINE 1 PATCH: 21 PATCH, EXTENDED RELEASE TRANSDERMAL at 08:08

## 2024-08-11 RX ADMIN — OLANZAPINE 15 MG: 5 TABLET, FILM COATED ORAL at 09:08

## 2024-08-11 RX ADMIN — MUPIROCIN: 20 OINTMENT TOPICAL at 08:08

## 2024-08-11 RX ADMIN — LEVOTHYROXINE SODIUM 50 MCG: 50 TABLET ORAL at 09:08

## 2024-08-11 RX ADMIN — TRAZODONE HYDROCHLORIDE 50 MG: 50 TABLET ORAL at 09:08

## 2024-08-11 RX ADMIN — HEPARIN SODIUM 5000 UNITS: 5000 INJECTION, SOLUTION INTRAVENOUS; SUBCUTANEOUS at 05:08

## 2024-08-11 RX ADMIN — HEPARIN SODIUM 5000 UNITS: 5000 INJECTION, SOLUTION INTRAVENOUS; SUBCUTANEOUS at 01:08

## 2024-08-11 RX ADMIN — SODIUM CHLORIDE, POTASSIUM CHLORIDE, SODIUM LACTATE AND CALCIUM CHLORIDE: 600; 310; 30; 20 INJECTION, SOLUTION INTRAVENOUS at 05:08

## 2024-08-11 RX ADMIN — HEPARIN SODIUM 5000 UNITS: 5000 INJECTION, SOLUTION INTRAVENOUS; SUBCUTANEOUS at 09:08

## 2024-08-11 RX ADMIN — DIVALPROEX SODIUM 500 MG: 500 TABLET, FILM COATED, EXTENDED RELEASE ORAL at 10:08

## 2024-08-11 RX ADMIN — BENZTROPINE MESYLATE 2 MG: 0.5 TABLET ORAL at 09:08

## 2024-08-11 RX ADMIN — SODIUM CHLORIDE, POTASSIUM CHLORIDE, SODIUM LACTATE AND CALCIUM CHLORIDE: 600; 310; 30; 20 INJECTION, SOLUTION INTRAVENOUS at 04:08

## 2024-08-11 RX ADMIN — SODIUM CHLORIDE, POTASSIUM CHLORIDE, SODIUM LACTATE AND CALCIUM CHLORIDE: 600; 310; 30; 20 INJECTION, SOLUTION INTRAVENOUS at 10:08

## 2024-08-11 NOTE — SUBJECTIVE & OBJECTIVE
Interval History/Significant Events: tangential speech. Mother at bedside, states he is no where near baseline.     Review of Systems   Psychiatric/Behavioral:  Positive for hallucinations. The patient is hyperactive.    All other systems reviewed and are negative.    Objective:     Vital Signs (Most Recent):  Temp: 98.1 °F (36.7 °C) (08/11/24 1145)  Pulse: 98 (08/11/24 1145)  Resp: 14 (08/11/24 1145)  BP: (!) 151/111 (08/11/24 1145)  SpO2: 100 % (08/11/24 1145) Vital Signs (24h Range):  Temp:  [97.6 °F (36.4 °C)-98.2 °F (36.8 °C)] 98.1 °F (36.7 °C)  Pulse:  [] 98  Resp:  [12-22] 14  SpO2:  [83 %-100 %] 100 %  BP: (118-151)/() 151/111   Weight: 111 kg (244 lb 11.4 oz)  Body mass index is 31.42 kg/m².      Intake/Output Summary (Last 24 hours) at 8/11/2024 1243  Last data filed at 8/11/2024 1228  Gross per 24 hour   Intake 4978.87 ml   Output 2825 ml   Net 2153.87 ml          Physical Exam  Vitals reviewed.   Constitutional:       Appearance: Normal appearance. He is obese.   HENT:      Right Ear: External ear normal.      Left Ear: External ear normal.      Mouth/Throat:      Mouth: Mucous membranes are moist.   Eyes:      Conjunctiva/sclera: Conjunctivae normal.   Cardiovascular:      Rate and Rhythm: Normal rate and regular rhythm.   Pulmonary:      Effort: Pulmonary effort is normal.      Breath sounds: Normal breath sounds.   Abdominal:      Palpations: Abdomen is soft.   Musculoskeletal:         General: Normal range of motion.   Skin:     General: Skin is warm and dry.      Capillary Refill: Capillary refill takes less than 2 seconds.   Neurological:      General: No focal deficit present.      Mental Status: He is alert.   Psychiatric:         Attention and Perception: He perceives auditory and visual hallucinations.         Mood and Affect: Affect is inappropriate.         Speech: Speech is tangential.         Thought Content: Thought content is paranoid and delusional.         Judgment: Judgment  is impulsive.            Vents:  Oxygen Concentration (%): 21 (08/10/24 1700)  Lines/Drains/Airways       Peripheral Intravenous Line  Duration                  Peripheral IV - Double Lumen 08/10/24 1905 20 G;22 G Anterior;Left Forearm <1 day                  Significant Labs:    CBC/Anemia Profile:  Recent Labs   Lab 08/10/24  0734 08/11/24  0312   WBC 6.88 7.48   HGB 12.7* 12.4*   HCT 40.1 38.2*    291   MCV 95.5 95.3   RDW 12.0 11.8        Chemistries:  Recent Labs   Lab 08/10/24  0734 08/11/24  0312    141   K 4.7 3.9    110*   CO2 28 27   BUN 16 12   CREATININE 1.10 1.02   CALCIUM 9.1 9.2   ALBUMIN  --  3.0*   PROT  --  5.8*   BILITOT  --  0.2   ALKPHOS  --  69   ALT  --  53   AST  --  51*   MG  --  1.8       All pertinent labs within the past 24 hours have been reviewed.    Significant Imaging:  I have reviewed all pertinent imaging results/findings within the past 24 hours.

## 2024-08-11 NOTE — PLAN OF CARE
Care Plans Advancing    Problem: Adult Inpatient Plan of Care  Goal: Plan of Care Review  Outcome: Progressing  Flowsheets (Taken 8/11/2024 0349)  Plan of Care Reviewed With:   patient   parent  Goal: Patient-Specific Goal (Individualized)  Outcome: Progressing  Goal: Absence of Hospital-Acquired Illness or Injury  Outcome: Progressing  Intervention: Identify and Manage Fall Risk  Flowsheets (Taken 8/11/2024 0349)  Safety Promotion/Fall Prevention:   assistive device/personal item within reach   medications reviewed   lighting adjusted  Intervention: Prevent Skin Injury  Flowsheets (Taken 8/11/2024 0349)  Skin Protection: incontinence pads utilized  Device Skin Pressure Protection:   absorbent pad utilized/changed   adhesive use limited  Intervention: Prevent and Manage VTE (Venous Thromboembolism) Risk  Flowsheets (Taken 8/11/2024 0349)  VTE Prevention/Management: ROM (active) performed  Intervention: Prevent Infection  Flowsheets (Taken 8/11/2024 0349)  Infection Prevention:   hand hygiene promoted   equipment surfaces disinfected   single patient room provided  Goal: Optimal Comfort and Wellbeing  Outcome: Progressing  Goal: Readiness for Transition of Care  Outcome: Progressing     Problem: Violence Risk or Actual  Goal: Anger and Impulse Control  Outcome: Progressing  Intervention: Minimize Safety Risk  Flowsheets (Taken 8/11/2024 0349)  Sensory Stimulation Regulation:   care clustered   quiet environment promoted     Problem: Skin Injury Risk Increased  Goal: Skin Health and Integrity  Outcome: Progressing     Problem: Infection  Goal: Absence of Infection Signs and Symptoms  Outcome: Progressing     Problem: Acute Kidney Injury/Impairment  Goal: Fluid and Electrolyte Balance  Outcome: Progressing  Goal: Improved Oral Intake  Outcome: Progressing  Goal: Effective Renal Function  Outcome: Progressing     Problem: Acute Kidney Injury/Impairment  Goal: Fluid and Electrolyte Balance  Outcome: Progressing  Goal:  Improved Oral Intake  Outcome: Progressing  Goal: Effective Renal Function  Outcome: Progressing

## 2024-08-11 NOTE — PLAN OF CARE
Problem: Adult Inpatient Plan of Care  Goal: Plan of Care Review  Outcome: Adequate for Care Transition  Goal: Patient-Specific Goal (Individualized)  Outcome: Adequate for Care Transition  Goal: Absence of Hospital-Acquired Illness or Injury  Outcome: Adequate for Care Transition  Goal: Optimal Comfort and Wellbeing  Outcome: Adequate for Care Transition  Goal: Readiness for Transition of Care  Outcome: Adequate for Care Transition     Problem: Violence Risk or Actual  Goal: Anger and Impulse Control  Outcome: Adequate for Care Transition     Problem: Skin Injury Risk Increased  Goal: Skin Health and Integrity  Outcome: Adequate for Care Transition     Problem: Infection  Goal: Absence of Infection Signs and Symptoms  Outcome: Adequate for Care Transition     Problem: Acute Kidney Injury/Impairment  Goal: Fluid and Electrolyte Balance  Outcome: Adequate for Care Transition  Goal: Improved Oral Intake  Outcome: Adequate for Care Transition  Goal: Effective Renal Function  Outcome: Adequate for Care Transition

## 2024-08-11 NOTE — ASSESSMENT & PLAN NOTE
Felt to be secondary to his exercising and walking for hours in the heat   Continues to improve Ck trended from 10,000 down to 3846  Continue IVF hydration   Repeat CK today is still pending. Will call lab and check on delay

## 2024-08-11 NOTE — PROGRESS NOTES
[facility]  Critical Care Medicine  Progress Note    Patient Name: Oli Lee  MRN: [id number]  Admission Date: 8/8/2024  Hospital Length of Stay: 3 days  Code Status: Full Code  Attending Provider: Eladio Escobedo MD  Primary Care Provider: No, Primary Doctor   Principal Problem: Non-traumatic rhabdomyolysis    Subjective:     HPI:  No notes on file    Hospital/ICU Course:  No notes on file    Interval History/Significant Events: tangential speech. Mother at bedside, states he is no where near baseline.     Review of Systems   Psychiatric/Behavioral:  Positive for hallucinations. The patient is hyperactive.    All other systems reviewed and are negative.    Objective:     Vital Signs (Most Recent):  Temp: 98.1 °F (36.7 °C) (08/11/24 1145)  Pulse: 98 (08/11/24 1145)  Resp: 14 (08/11/24 1145)  BP: (!) 151/111 (08/11/24 1145)  SpO2: 100 % (08/11/24 1145) Vital Signs (24h Range):  Temp:  [97.6 °F (36.4 °C)-98.2 °F (36.8 °C)] 98.1 °F (36.7 °C)  Pulse:  [] 98  Resp:  [12-22] 14  SpO2:  [83 %-100 %] 100 %  BP: (118-151)/() 151/111   Weight: 111 kg (244 lb 11.4 oz)  Body mass index is 31.42 kg/m².      Intake/Output Summary (Last 24 hours) at 8/11/2024 1243  Last data filed at 8/11/2024 1228  Gross per 24 hour   Intake 4978.87 ml   Output 2825 ml   Net 2153.87 ml          Physical Exam  Vitals reviewed.   Constitutional:       Appearance: Normal appearance. He is obese.   HENT:      Right Ear: External ear normal.      Left Ear: External ear normal.      Mouth/Throat:      Mouth: Mucous membranes are moist.   Eyes:      Conjunctiva/sclera: Conjunctivae normal.   Cardiovascular:      Rate and Rhythm: Normal rate and regular rhythm.   Pulmonary:      Effort: Pulmonary effort is normal.      Breath sounds: Normal breath sounds.   Abdominal:      Palpations: Abdomen is soft.   Musculoskeletal:         General: Normal range of motion.   Skin:     General: Skin is warm and dry.  "     Capillary Refill: Capillary refill takes less than 2 seconds.   Neurological:      General: No focal deficit present.      Mental Status: He is alert.   Psychiatric:         Attention and Perception: He perceives auditory and visual hallucinations.         Mood and Affect: Affect is inappropriate.         Speech: Speech is tangential.         Thought Content: Thought content is paranoid and delusional.         Judgment: Judgment is impulsive.            Vents:  Oxygen Concentration (%): 21 (08/10/24 1700)  Lines/Drains/Airways       Peripheral Intravenous Line  Duration                  Peripheral IV - Double Lumen 08/10/24 1905 20 G;22 G Anterior;Left Forearm <1 day                  Significant Labs:    CBC/Anemia Profile:  Recent Labs   Lab 08/10/24  0734 08/11/24  0312   WBC 6.88 7.48   HGB 12.7* 12.4*   HCT 40.1 38.2*    291   MCV 95.5 95.3   RDW 12.0 11.8        Chemistries:  Recent Labs   Lab 08/10/24  0734 08/11/24  0312    141   K 4.7 3.9    110*   CO2 28 27   BUN 16 12   CREATININE 1.10 1.02   CALCIUM 9.1 9.2   ALBUMIN  --  3.0*   PROT  --  5.8*   BILITOT  --  0.2   ALKPHOS  --  69   ALT  --  53   AST  --  51*   MG  --  1.8       All pertinent labs within the past 24 hours have been reviewed.    Significant Imaging:  I have reviewed all pertinent imaging results/findings within the past 24 hours.    ABG  No results for input(s): "PH", "PO2", "PCO2", "HCO3", "BE" in the last 168 hours.  Assessment/Plan:     Renal/  Acute renal failure with acute cortical necrosis  This is secondary to rhabdomyolysis     Continued to improve with IVF hydration - Cr 1.10 today     Endocrine  Acquired hypothyroidism  Continue home meds  TSH 1.5     GI  Transaminitis  Resolved     Orthopedic  * Non-traumatic rhabdomyolysis  Felt to be secondary to his exercising and walking for hours in the heat   Continues to improve Ck trended from 10,000 down to 3846  Continue IVF hydration   Repeat CK today is still " pending. Will call lab and check on delay       Other  Acute exacerbation of chronic paranoid schizophrenia    Per tele-psych Recommends inpatient treatment   All hospitals med-psych hospitals were on diversion so he was transferred to [facility] for medical management of his CK   Once resolved will place [facility] order to transfer to inpatient psych          Emily Mo, AG-ACNP  Critical Care Medicine  [facility]

## 2024-08-12 VITALS
OXYGEN SATURATION: 98 % | SYSTOLIC BLOOD PRESSURE: 149 MMHG | TEMPERATURE: 99 F | BODY MASS INDEX: 31.4 KG/M2 | WEIGHT: 244.69 LBS | DIASTOLIC BLOOD PRESSURE: 94 MMHG | RESPIRATION RATE: 16 BRPM | HEIGHT: 74 IN | HEART RATE: 85 BPM

## 2024-08-12 LAB
ANION GAP SERPL CALCULATED.3IONS-SCNC: 9 MMOL/L (ref 7–16)
BUN SERPL-MCNC: 12 MG/DL (ref 7–18)
BUN/CREAT SERPL: 12 (ref 6–20)
CALCIUM SERPL-MCNC: 8.9 MG/DL (ref 8.5–10.1)
CHLORIDE SERPL-SCNC: 110 MMOL/L (ref 98–107)
CK SERPL-CCNC: 857 U/L (ref 39–308)
CO2 SERPL-SCNC: 26 MMOL/L (ref 21–32)
CREAT SERPL-MCNC: 1.04 MG/DL (ref 0.7–1.3)
EGFR (NO RACE VARIABLE) (RUSH/TITUS): 90 ML/MIN/1.73M2
GLUCOSE SERPL-MCNC: 175 MG/DL (ref 74–106)
POTASSIUM SERPL-SCNC: 4.2 MMOL/L (ref 3.5–5.1)
SODIUM SERPL-SCNC: 141 MMOL/L (ref 136–145)

## 2024-08-12 PROCEDURE — 94761 N-INVAS EAR/PLS OXIMETRY MLT: CPT

## 2024-08-12 PROCEDURE — 82550 ASSAY OF CK (CPK): CPT | Performed by: NURSE PRACTITIONER

## 2024-08-12 PROCEDURE — 80048 BASIC METABOLIC PNL TOTAL CA: CPT | Performed by: NURSE PRACTITIONER

## 2024-08-12 PROCEDURE — 20000000 HC ICU ROOM

## 2024-08-12 PROCEDURE — 63600175 PHARM REV CODE 636 W HCPCS: Performed by: INTERNAL MEDICINE

## 2024-08-12 PROCEDURE — 25000003 PHARM REV CODE 250: Performed by: HOSPITALIST

## 2024-08-12 PROCEDURE — 25000003 PHARM REV CODE 250: Performed by: INTERNAL MEDICINE

## 2024-08-12 PROCEDURE — 36415 COLL VENOUS BLD VENIPUNCTURE: CPT | Performed by: NURSE PRACTITIONER

## 2024-08-12 PROCEDURE — S4991 NICOTINE PATCH NONLEGEND: HCPCS | Performed by: NURSE PRACTITIONER

## 2024-08-12 PROCEDURE — 25000003 PHARM REV CODE 250: Performed by: NURSE PRACTITIONER

## 2024-08-12 PROCEDURE — 63600175 PHARM REV CODE 636 W HCPCS: Performed by: NURSE PRACTITIONER

## 2024-08-12 RX ADMIN — BENZTROPINE MESYLATE 2 MG: 0.5 TABLET ORAL at 09:08

## 2024-08-12 RX ADMIN — TRAZODONE HYDROCHLORIDE 50 MG: 50 TABLET ORAL at 09:08

## 2024-08-12 RX ADMIN — MUPIROCIN: 20 OINTMENT TOPICAL at 09:08

## 2024-08-12 RX ADMIN — NICOTINE 1 PATCH: 21 PATCH, EXTENDED RELEASE TRANSDERMAL at 08:08

## 2024-08-12 RX ADMIN — HEPARIN SODIUM 5000 UNITS: 5000 INJECTION, SOLUTION INTRAVENOUS; SUBCUTANEOUS at 05:08

## 2024-08-12 RX ADMIN — DIVALPROEX SODIUM 500 MG: 500 TABLET, FILM COATED, EXTENDED RELEASE ORAL at 08:08

## 2024-08-12 RX ADMIN — SODIUM CHLORIDE, POTASSIUM CHLORIDE, SODIUM LACTATE AND CALCIUM CHLORIDE: 600; 310; 30; 20 INJECTION, SOLUTION INTRAVENOUS at 09:08

## 2024-08-12 RX ADMIN — HEPARIN SODIUM 5000 UNITS: 5000 INJECTION, SOLUTION INTRAVENOUS; SUBCUTANEOUS at 02:08

## 2024-08-12 RX ADMIN — OLANZAPINE 15 MG: 5 TABLET, FILM COATED ORAL at 09:08

## 2024-08-12 RX ADMIN — LEVOTHYROXINE SODIUM 50 MCG: 50 TABLET ORAL at 09:08

## 2024-08-12 RX ADMIN — HEPARIN SODIUM 5000 UNITS: 5000 INJECTION, SOLUTION INTRAVENOUS; SUBCUTANEOUS at 09:08

## 2024-08-12 RX ADMIN — SODIUM CHLORIDE, POTASSIUM CHLORIDE, SODIUM LACTATE AND CALCIUM CHLORIDE: 600; 310; 30; 20 INJECTION, SOLUTION INTRAVENOUS at 01:08

## 2024-08-12 NOTE — PLAN OF CARE
Rcd call from RN now regarding [facility] for psych placement, [facility] contact Leanna states she in unable to come do a face to face assessment of this pt they are wanting a to do a telephone evaluation however there are no phones in unit and no cordless phone for pt and mother not at bedside this time. Spoke with SISANDREW Watts she is sending a worker to come do a bedside evaluation with recommendations. Will POSSIBLE be able to accept pt if a bed comes available in [facility] for [facility]. Will follow in AM. notified [facility] patient care assistance of these actions.

## 2024-08-12 NOTE — PLAN OF CARE
Problem: Adult Inpatient Plan of Care  Goal: Plan of Care Review  Outcome: Progressing  Goal: Patient-Specific Goal (Individualized)  Outcome: Progressing  Goal: Absence of Hospital-Acquired Illness or Injury  Outcome: Progressing  Goal: Optimal Comfort and Wellbeing  Outcome: Progressing  Goal: Readiness for Transition of Care  Outcome: Progressing     Problem: Violence Risk or Actual  Goal: Anger and Impulse Control  Outcome: Progressing     Problem: Skin Injury Risk Increased  Goal: Skin Health and Integrity  Outcome: Progressing     Problem: Infection  Goal: Absence of Infection Signs and Symptoms  Outcome: Progressing     Problem: Acute Kidney Injury/Impairment  Goal: Fluid and Electrolyte Balance  Outcome: Progressing  Goal: Improved Oral Intake  Outcome: Progressing  Goal: Effective Renal Function  Outcome: Progressing

## 2024-08-12 NOTE — PLAN OF CARE
Care Plans Advancing    Problem: Adult Inpatient Plan of Care  Goal: Plan of Care Review  Outcome: Progressing  Flowsheets (Taken 8/12/2024 0001)  Plan of Care Reviewed With:   patient   parent  Goal: Patient-Specific Goal (Individualized)  Outcome: Progressing  Goal: Absence of Hospital-Acquired Illness or Injury  Outcome: Progressing  Intervention: Identify and Manage Fall Risk  Flowsheets (Taken 8/12/2024 0001)  Safety Promotion/Fall Prevention:   assistive device/personal item within reach   medications reviewed   lighting adjusted  Intervention: Prevent Skin Injury  Flowsheets (Taken 8/12/2024 0001)  Skin Protection: incontinence pads utilized  Device Skin Pressure Protection:   adhesive use limited   positioning supports utilized  Intervention: Prevent and Manage VTE (Venous Thromboembolism) Risk  Flowsheets (Taken 8/12/2024 0001)  VTE Prevention/Management: ROM (active) performed  Intervention: Prevent Infection  Flowsheets (Taken 8/12/2024 0001)  Infection Prevention:   equipment surfaces disinfected   hand hygiene promoted   single patient room provided  Goal: Optimal Comfort and Wellbeing  Outcome: Progressing  Goal: Readiness for Transition of Care  Outcome: Progressing     Problem: Violence Risk or Actual  Goal: Anger and Impulse Control  Outcome: Progressing  Intervention: Minimize Safety Risk  Flowsheets (Taken 8/12/2024 0001)  Sensory Stimulation Regulation:   music on   care clustered     Problem: Skin Injury Risk Increased  Goal: Skin Health and Integrity  Outcome: Progressing  Intervention: Optimize Skin Protection  Flowsheets (Taken 8/12/2024 0001)  Pressure Reduction Techniques: frequent weight shift encouraged  Skin Protection: incontinence pads utilized     Problem: Infection  Goal: Absence of Infection Signs and Symptoms  Outcome: Progressing     Problem: Acute Kidney Injury/Impairment  Goal: Fluid and Electrolyte Balance  Outcome: Progressing  Goal: Improved Oral Intake  Outcome:  Progressing  Goal: Effective Renal Function  Outcome: Progressing

## 2024-08-12 NOTE — NURSING
Call received from [facility]. Patient has been accepted for transfer for Psychiatry to [facility]. Accepted by Dr Velázquez. Transport needs to be setup for pt to transfer today

## 2024-08-12 NOTE — NURSING
Patient sitting on side of bed, consumed 100% of lunch, very good appetite. Calm and cooperative, watching youtube. Confirmed with Emily Mo NP for [facility] transfer to behavioral health center is voluntary at this time.

## 2024-08-13 LAB — ANA SER QL: NEGATIVE

## 2024-08-13 NOTE — HOSPITAL COURSE
Patient was admitted to the hospital rhabdomyolysis nontraumatic CPK came down to less than 1000 and is creatinine returned normal he had acute exacerbation schizophrenia discharge patient to inpatient psychiatric facility his hospital for remain reasonably benign require IV fluids no antibiotics ready discharge psych

## 2024-08-13 NOTE — NURSING
08/12/2024 - 22:00  Called report to Myrna at [facility] in [place], [place].    08/12/2024 - 22:30  Called patient's mother, Rika Burgos.  No answer.  Left message on answering machine.    08/12/2024 - 22:40  Called patient's mother, Rika Burgos.  No answer.    08/12/2024 - 22:40  Called patient's mother, Rika Burgos.  No answer.    08/12/2024 - 23:00  [employer] paperwork completed online and face sheet faxed to [facility].    08/12/2024 - 23:55  [facility] crew arrived and loaded patient for transport to [facility] in [place], [place].  All patient belongings and discharge paperwork transported with patient.    08/13/2024 - 00:00  Called patient's mother, Rika Burgos, again.  No answer.  Left message on answering machine indicating that patient is in route to [facility] in [place], [place].  [facility] phone number also left on answering machine and Rika instructed to call with questions.    08/13/2024 - 07:00   Patient's mother returned call from earlier.  Mother informed that patient was transferred to [facility] in [place], [place].  Mother given phone number to contact [facility] in [place], [place].

## 2024-08-13 NOTE — PLAN OF CARE
Care Plans Advancing    Problem: Adult Inpatient Plan of Care  Goal: Plan of Care Review  Outcome: Progressing  Flowsheets (Taken 8/12/2024 2302)  Plan of Care Reviewed With: patient  Goal: Patient-Specific Goal (Individualized)  Outcome: Progressing  Goal: Absence of Hospital-Acquired Illness or Injury  Outcome: Progressing  Intervention: Identify and Manage Fall Risk  Flowsheets (Taken 8/12/2024 2302)  Safety Promotion/Fall Prevention:   assistive device/personal item within reach   pulse ox   lighting adjusted   medications reviewed  Intervention: Prevent Skin Injury  Flowsheets (Taken 8/12/2024 2302)  Skin Protection: incontinence pads utilized  Device Skin Pressure Protection:   absorbent pad utilized/changed   positioning supports utilized   adhesive use limited  Intervention: Prevent Infection  Flowsheets (Taken 8/12/2024 2302)  Infection Prevention:   equipment surfaces disinfected   hand hygiene promoted   single patient room provided  Goal: Optimal Comfort and Wellbeing  Outcome: Progressing  Goal: Readiness for Transition of Care  Outcome: Progressing     Problem: Violence Risk or Actual  Goal: Anger and Impulse Control  Outcome: Progressing     Problem: Skin Injury Risk Increased  Goal: Skin Health and Integrity  Outcome: Progressing     Problem: Infection  Goal: Absence of Infection Signs and Symptoms  Outcome: Progressing     Problem: Infection  Goal: Absence of Infection Signs and Symptoms  Outcome: Progressing     Problem: Acute Kidney Injury/Impairment  Goal: Fluid and Electrolyte Balance  Outcome: Progressing  Goal: Improved Oral Intake  Outcome: Progressing  Goal: Effective Renal Function  Outcome: Progressing

## 2024-08-13 NOTE — PLAN OF CARE
[facility] ICU  Discharge Final Note    Primary Care Provider: No, Primary Doctor    Expected Discharge Date:     Final Discharge Note (most recent)       Final Note - 08/13/24 0839          Final Note    Assessment Type Final Discharge Note     Anticipated Discharge Disposition Psychiatric Hospital        Post-Acute Status    Post-Acute Authorization Placement     Post-Acute Placement Status Set-up Complete/Auth obtained     Discharge Delays None known at this time                     Important Message from Medicare             pt dc to [facility] per notes. 0 further DC needs.

## 2024-08-13 NOTE — DISCHARGE SUMMARY
[facility] Hospital Medicine  Discharge Summary      Patient Name: Oli Lee  MRN: [id number]  KANG: [id number]  Patient Class: IP- Inpatient  Admission Date: 8/8/2024  Hospital Length of Stay: 4 days  Discharge Date and Time:  08/13/2024 1:14 PM  Attending Physician: Casandra att. providers found   Discharging Provider: Eladio Escobedo MD  Primary Care Provider: Casandra, Primary Doctor    Primary Care Team: Networked reference to record PCT     HPI:   43 yo M presents to [facility] ED with his mother for worsening paranoia and psychosis over the past week.  She thinks he has stopped taking his medications.  She attempted to take him to [facility] in [place] but he refused to go because he says he does not trust them.  He has been given antipsychotics and sedatives at [facility] and seems to be improving at present.  His father is with him and his mother is on the way.  He says he did not have a heat stroke.  He is not hot.  ROS and HPI from him will not be reliable.  No reports of extended times outside or without AC.  He is not on a statin but his CPK is over 10K.  His UDS is only positive for THC and the ativan given at the [facility].      His valproic acid level is nondetectable.  He has not been taking his 250 mg daily dose.  Not sure if there is a diagnosis of bipolar and this is being used as a mood stabilizer.  No h/o sezure d/o and this is not the usual AED dosing.  I will restart his home meds all at night which should help with compliance.  RPR, HIV, hep panel, etc pending.      See assessment and plan below for problem based evaluation.      * No surgery found *      Hospital Course:   Patient was admitted to the hospital rhabdomyolysis nontraumatic CPK came down to less than 1000 and is creatinine returned normal he had acute exacerbation schizophrenia discharge patient to inpatient psychiatric facility his hospital for remain reasonably benign require IV fluids no antibiotics ready discharge  psych     Goals of Care Treatment Preferences:  Code Status: Full Code         Consults:     No new Assessment & Plan notes have been filed under this hospital service since the last note was generated.  Service: Hospital Medicine    Final Active Diagnoses:    Diagnosis Date Noted POA    PRINCIPAL PROBLEM:  Non-traumatic rhabdomyolysis [M62.82] 08/08/2024 Yes    Acute renal failure with acute cortical necrosis [N17.1] 08/08/2024 Yes    Acute exacerbation of chronic paranoid schizophrenia [F20.0] 08/08/2024 Yes    Transaminitis [R74.01] 08/08/2024 Yes    Acquired hypothyroidism [E03.9]  Yes      Problems Resolved During this Admission:       Discharged Condition: fair    Disposition: Psychiatric Hospital    Follow Up:    Patient Instructions:   No discharge procedures on file.    Significant Diagnostic Studies: N/A    Pending Diagnostic Studies:       None           Medications:  Reconciled Home Medications:      Medication List        ASK your doctor about these medications      benztropine 1 MG tablet  Commonly known as: COGENTIN  Take 1 mg by mouth 2 (two) times daily.     divalproex 500 MG Tbec  Commonly known as: DEPAKOTE  Take 500 mg by mouth 3 (three) times daily.  Ask about: Which instructions should I use?     haloperidoL 10 MG tablet  Commonly known as: HALDOL  Take 1 tablet by mouth once daily.     levothyroxine 50 MCG tablet  Commonly known as: SYNTHROID  Take 1 tablet (50 mcg total) by mouth before breakfast.     melatonin 10 mg Tab  Take 1 tablet (10 mg total) by mouth nightly as needed (difficulty sleeping).     OLANZapine 15 MG Tab  Commonly known as: ZyPREXA  Take 1 tablet (15 mg total) by mouth nightly.     traZODone 50 MG tablet  Commonly known as: DESYREL              Indwelling Lines/Drains at time of discharge:   Lines/Drains/Airways       None                   Time spent on the discharge of patient:  5 minutes         Eladio Escobedo MD  Department of Hospital Medicine  [facility]  [facility]

## 2024-08-15 LAB — VIT B1 BLD-SCNC: 123 NMOL/L (ref 70–180)

## 2024-08-24 ENCOUNTER — HOSPITAL ENCOUNTER (EMERGENCY)
Facility: HOSPITAL | Age: 44
Discharge: ANOTHER HOSPITAL | End: 2024-08-24
Payer: COMMERCIAL

## 2024-08-24 ENCOUNTER — HOSPITAL ENCOUNTER (EMERGENCY)
Facility: HOSPITAL | Age: 44
Discharge: PSYCHIATRIC OR SUBSTANCE USE FACILITY | End: 2024-08-24
Attending: EMERGENCY MEDICINE
Payer: COMMERCIAL

## 2024-08-24 VITALS
RESPIRATION RATE: 20 BRPM | HEART RATE: 91 BPM | BODY MASS INDEX: 33.37 KG/M2 | SYSTOLIC BLOOD PRESSURE: 121 MMHG | TEMPERATURE: 98 F | HEIGHT: 74 IN | DIASTOLIC BLOOD PRESSURE: 86 MMHG | OXYGEN SATURATION: 96 % | WEIGHT: 260 LBS

## 2024-08-24 VITALS
HEART RATE: 86 BPM | BODY MASS INDEX: 33.37 KG/M2 | DIASTOLIC BLOOD PRESSURE: 76 MMHG | TEMPERATURE: 98 F | SYSTOLIC BLOOD PRESSURE: 115 MMHG | WEIGHT: 260 LBS | OXYGEN SATURATION: 97 % | RESPIRATION RATE: 12 BRPM | HEIGHT: 74 IN

## 2024-08-24 DIAGNOSIS — F23 ACUTE PSYCHOSIS: Primary | ICD-10-CM

## 2024-08-24 DIAGNOSIS — F29 PSYCHOSIS, UNSPECIFIED PSYCHOSIS TYPE: ICD-10-CM

## 2024-08-24 DIAGNOSIS — F12.10 MARIJUANA ABUSE: ICD-10-CM

## 2024-08-24 DIAGNOSIS — R00.0 TACHYCARDIA: ICD-10-CM

## 2024-08-24 DIAGNOSIS — R45.1 AGITATION: Primary | ICD-10-CM

## 2024-08-24 DIAGNOSIS — R45.6 VIOLENT BEHAVIOR: ICD-10-CM

## 2024-08-24 DIAGNOSIS — R45.850 HOMICIDAL IDEATION: ICD-10-CM

## 2024-08-24 DIAGNOSIS — R45.89 THREATENING TO SELF: ICD-10-CM

## 2024-08-24 LAB
ALBUMIN SERPL BCP-MCNC: 4.3 G/DL (ref 3.5–5)
ALBUMIN/GLOB SERPL: 1 {RATIO}
ALP SERPL-CCNC: 75 U/L (ref 45–115)
ALT SERPL W P-5'-P-CCNC: 46 U/L (ref 16–61)
AMPHET UR QL SCN: NEGATIVE
ANION GAP SERPL CALCULATED.3IONS-SCNC: 12 MMOL/L (ref 7–16)
APAP SERPL-MCNC: <2 ΜG/ML (ref 10–30)
AST SERPL W P-5'-P-CCNC: 19 U/L (ref 15–37)
BARBITURATES UR QL SCN: NEGATIVE
BASOPHILS # BLD AUTO: 0.01 K/UL (ref 0–0.2)
BASOPHILS NFR BLD AUTO: 0.1 % (ref 0–1)
BENZODIAZ METAB UR QL SCN: NEGATIVE
BILIRUB SERPL-MCNC: 0.4 MG/DL (ref ?–1.2)
BILIRUB UR QL STRIP: ABNORMAL
BUN SERPL-MCNC: 15 MG/DL (ref 7–18)
BUN/CREAT SERPL: 11 (ref 6–20)
CALCIUM SERPL-MCNC: 9.1 MG/DL (ref 8.5–10.1)
CANNABINOIDS UR QL SCN: POSITIVE
CHLORIDE SERPL-SCNC: 104 MMOL/L (ref 98–107)
CLARITY UR: ABNORMAL
CO2 SERPL-SCNC: 30 MMOL/L (ref 21–32)
COCAINE UR QL SCN: NEGATIVE
COLOR UR: ABNORMAL
CREAT SERPL-MCNC: 1.32 MG/DL (ref 0.7–1.3)
DIFFERENTIAL METHOD BLD: ABNORMAL
EGFR (NO RACE VARIABLE) (RUSH/TITUS): 68 ML/MIN/1.73M2
EOSINOPHIL # BLD AUTO: 0.02 K/UL (ref 0–0.5)
EOSINOPHIL NFR BLD AUTO: 0.2 % (ref 1–4)
ERYTHROCYTE [DISTWIDTH] IN BLOOD BY AUTOMATED COUNT: 12.5 % (ref 11.5–14.5)
ETHANOL, BLOOD (CATEGORY): NOT DETECTED
GLOBULIN SER-MCNC: 4.2 G/DL (ref 2–4)
GLUCOSE SERPL-MCNC: 154 MG/DL (ref 74–106)
GLUCOSE UR STRIP-MCNC: 100 MG/DL
HCT VFR BLD AUTO: 42.4 % (ref 40–54)
HGB BLD-MCNC: 13.6 G/DL (ref 13.5–18)
IMM GRANULOCYTES # BLD AUTO: 0.04 K/UL (ref 0–0.04)
IMM GRANULOCYTES NFR BLD: 0.3 % (ref 0–0.4)
KETONES UR STRIP-SCNC: ABNORMAL MG/DL
LEUKOCYTE ESTERASE UR QL STRIP: NEGATIVE
LYMPHOCYTES # BLD AUTO: 1.77 K/UL (ref 1–4.8)
LYMPHOCYTES NFR BLD AUTO: 13.6 % (ref 27–41)
MAGNESIUM SERPL-MCNC: 1.6 MG/DL (ref 1.7–2.3)
MCH RBC QN AUTO: 30.7 PG (ref 27–31)
MCHC RBC AUTO-ENTMCNC: 32.1 G/DL (ref 32–36)
MCV RBC AUTO: 95.7 FL (ref 80–96)
MONOCYTES # BLD AUTO: 0.53 K/UL (ref 0–0.8)
MONOCYTES NFR BLD AUTO: 4.1 % (ref 2–6)
MPC BLD CALC-MCNC: 9.4 FL (ref 9.4–12.4)
MUCOUS THREADS #/AREA URNS HPF: ABNORMAL /HPF
NEUTROPHILS # BLD AUTO: 10.66 K/UL (ref 1.8–7.7)
NEUTROPHILS NFR BLD AUTO: 81.7 % (ref 53–65)
NITRITE UR QL STRIP: NEGATIVE
NRBC # BLD AUTO: 0 X10E3/UL
NRBC, AUTO (.00): 0 %
OHS QRS DURATION: 80 MS
OHS QTC CALCULATION: 464 MS
OPIATES UR QL SCN: NEGATIVE
PCP UR QL SCN: NEGATIVE
PH UR STRIP: 5.5 PH UNITS
PLATELET # BLD AUTO: 323 K/UL (ref 150–400)
POTASSIUM SERPL-SCNC: 4.1 MMOL/L (ref 3.5–5.1)
PROT SERPL-MCNC: 8.5 G/DL (ref 6.4–8.2)
PROT UR QL STRIP: 30
RBC # BLD AUTO: 4.43 M/UL (ref 4.6–6.2)
RBC # UR STRIP: NEGATIVE /UL
RBC #/AREA URNS HPF: ABNORMAL /HPF
SALICYLATES SERPL-MCNC: 1.4 MG/DL (ref 3–30)
SARS-COV-2 RDRP RESP QL NAA+PROBE: NEGATIVE
SODIUM SERPL-SCNC: 142 MMOL/L (ref 136–145)
SP GR UR STRIP: >=1.03
SQUAMOUS #/AREA URNS LPF: ABNORMAL /LPF
UROBILINOGEN UR STRIP-ACNC: 1 MG/DL
WBC # BLD AUTO: 13.03 K/UL (ref 4.5–11)
WBC #/AREA URNS HPF: ABNORMAL /HPF

## 2024-08-24 PROCEDURE — 96360 HYDRATION IV INFUSION INIT: CPT

## 2024-08-24 PROCEDURE — 82077 ASSAY SPEC XCP UR&BREATH IA: CPT

## 2024-08-24 PROCEDURE — 25000003 PHARM REV CODE 250: Performed by: EMERGENCY MEDICINE

## 2024-08-24 PROCEDURE — 85025 COMPLETE CBC W/AUTO DIFF WBC: CPT

## 2024-08-24 PROCEDURE — 80307 DRUG TEST PRSMV CHEM ANLYZR: CPT

## 2024-08-24 PROCEDURE — 99285 EMERGENCY DEPT VISIT HI MDM: CPT | Mod: 25

## 2024-08-24 PROCEDURE — 80143 DRUG ASSAY ACETAMINOPHEN: CPT

## 2024-08-24 PROCEDURE — 80179 DRUG ASSAY SALICYLATE: CPT

## 2024-08-24 PROCEDURE — 96372 THER/PROPH/DIAG INJ SC/IM: CPT | Mod: 59

## 2024-08-24 PROCEDURE — 81001 URINALYSIS AUTO W/SCOPE: CPT | Mod: XB

## 2024-08-24 PROCEDURE — 81003 URINALYSIS AUTO W/O SCOPE: CPT

## 2024-08-24 PROCEDURE — 83735 ASSAY OF MAGNESIUM: CPT

## 2024-08-24 PROCEDURE — 87635 SARS-COV-2 COVID-19 AMP PRB: CPT | Performed by: EMERGENCY MEDICINE

## 2024-08-24 PROCEDURE — 80053 COMPREHEN METABOLIC PANEL: CPT

## 2024-08-24 PROCEDURE — 96361 HYDRATE IV INFUSION ADD-ON: CPT

## 2024-08-24 PROCEDURE — S4991 NICOTINE PATCH NONLEGEND: HCPCS | Performed by: EMERGENCY MEDICINE

## 2024-08-24 PROCEDURE — 25000003 PHARM REV CODE 250

## 2024-08-24 PROCEDURE — 93005 ELECTROCARDIOGRAM TRACING: CPT

## 2024-08-24 PROCEDURE — 63600175 PHARM REV CODE 636 W HCPCS

## 2024-08-24 RX ORDER — HALOPERIDOL 5 MG/ML
5 INJECTION INTRAMUSCULAR
Status: COMPLETED | OUTPATIENT
Start: 2024-08-24 | End: 2024-08-24

## 2024-08-24 RX ORDER — DIPHENHYDRAMINE HYDROCHLORIDE 50 MG/ML
50 INJECTION INTRAMUSCULAR; INTRAVENOUS
Status: COMPLETED | OUTPATIENT
Start: 2024-08-24 | End: 2024-08-24

## 2024-08-24 RX ORDER — LORAZEPAM 2 MG/ML
2 INJECTION INTRAMUSCULAR
Status: DISCONTINUED | OUTPATIENT
Start: 2024-08-24 | End: 2024-08-24

## 2024-08-24 RX ORDER — LORAZEPAM 2 MG/ML
2 INJECTION INTRAMUSCULAR
Status: COMPLETED | OUTPATIENT
Start: 2024-08-24 | End: 2024-08-24

## 2024-08-24 RX ORDER — HALOPERIDOL 5 MG/ML
10 INJECTION INTRAMUSCULAR
Status: DISCONTINUED | OUTPATIENT
Start: 2024-08-24 | End: 2024-08-24

## 2024-08-24 RX ORDER — IBUPROFEN 200 MG
1 TABLET ORAL DAILY
Status: DISCONTINUED | OUTPATIENT
Start: 2024-08-24 | End: 2024-08-24

## 2024-08-24 RX ORDER — IBUPROFEN 200 MG
1 TABLET ORAL DAILY
Status: DISCONTINUED | OUTPATIENT
Start: 2024-08-24 | End: 2024-08-24 | Stop reason: HOSPADM

## 2024-08-24 RX ADMIN — HALOPERIDOL LACTATE 5 MG: 5 INJECTION, SOLUTION INTRAMUSCULAR at 06:08

## 2024-08-24 RX ADMIN — SODIUM CHLORIDE 1000 ML: 9 INJECTION, SOLUTION INTRAVENOUS at 06:08

## 2024-08-24 RX ADMIN — NICOTINE 1 PATCH: 21 PATCH, EXTENDED RELEASE TRANSDERMAL at 01:08

## 2024-08-24 RX ADMIN — DIPHENHYDRAMINE HYDROCHLORIDE 50 MG: 50 INJECTION INTRAMUSCULAR; INTRAVENOUS at 05:08

## 2024-08-24 RX ADMIN — LORAZEPAM 2 MG: 2 INJECTION INTRAMUSCULAR; INTRAVENOUS at 05:08

## 2024-08-24 NOTE — CONSULTS
[facility]  Psychiatry  Telepsychiatry Consult Note      Tele-Consultation from Psychiatry started: 8/24/2024 at 9:47am,   Per Primary Provider pt no longer needs the consult as he is being transferred to a different hospital.    Yahaira Oh MD   Psychiatry  [facility]

## 2024-08-24 NOTE — ED NOTES
Patient left [facility] ED via [facility]. Soft restraints noted to right and left ankle, but not secured to stretcher. Patient alert and calm, with even unlabored respirations. No distress noted.

## 2024-08-24 NOTE — ED NOTES
Attempted to call report. They stated they was not sure what floor pt was going to and would call me back.

## 2024-08-24 NOTE — ED PROVIDER NOTES
Encounter Date: 8/24/2024       History     Chief Complaint   Patient presents with    Psychiatric Evaluation     Patient is a 44-year-old male brought to the emergency department by his mother for psych evaluation.  Patient is well known to the ED and was seen here 2 weeks prior for the same complaints where he became agitated and violent requiring chemical restraints as well as treatment medically for an ALIZA.  He was transferred to [facility] ICU and ultimately transferred to [facility] for psych services.  The mother reports that he was discharged from their facility this past Wednesday and has been difficult to control since that time.  She reports that his behavior has gotten worse.  She states that he was rambling, making threats, and wandering around the yard at all hours of the day and night.  She reports that he was not been compliant with any of his medication.  He is currently noted to be speaking erratically, talking to people who are not there, and threatening to kill people.  He has a known past medical history of hypothyroidism, marijuana abuse, schizophrenia, and nicotine dependence.  Blood pressure is 174/105, temperature 98.1°, heart rate 111, respirations 22, and oxygen saturation 98% on room air.    The history is provided by the patient, a relative and medical records.     Review of patient's allergies indicates:  No Known Allergies  Past Medical History:   Diagnosis Date    Acquired hypothyroidism     Involuntary commitment 02/06/2024    Marijuana dependence     Nicotine dependence     Paranoid schizophrenia      History reviewed. No pertinent surgical history.  No family history on file.  Social History     Tobacco Use    Smoking status: Every Day     Current packs/day: 1.00     Types: Cigarettes     Passive exposure: Never    Smokeless tobacco: Never   Substance Use Topics    Alcohol use: Yes     Alcohol/week: 2.0 standard drinks of alcohol     Types: 2 Cans of beer per week     Drug use: Not Currently     Frequency: 3.0 times per week     Types: Marijuana     Review of Systems   Unable to perform ROS: Mental status change       Physical Exam     Initial Vitals [08/24/24 0515]   BP Pulse Resp Temp SpO2   (!) 174/105 (!) 111 (!) 22 98.1 °F (36.7 °C) 98 %      MAP       --         Physical Exam    Constitutional: He appears well-developed and well-nourished. He is active and uncooperative. He appears distressed.   HENT:   Head: Normocephalic and atraumatic.   Right Ear: External ear normal.   Left Ear: External ear normal.   Nose: Nose normal.   Mouth/Throat: Oropharynx is clear and moist.   Eyes: EOM are normal. Pupils are equal, round, and reactive to light. Right conjunctiva is injected. Left conjunctiva is injected.   Neck: Neck supple.   Normal range of motion.   Full passive range of motion without pain.     Cardiovascular:  S1 normal, S2 normal and normal pulses.   Tachycardia present.         Pulmonary/Chest: Effort normal and breath sounds normal. No respiratory distress. He has no decreased breath sounds. He has no wheezes. He has no rhonchi. He has no rales.   Abdominal: Bowel sounds are normal. He exhibits no distension. There is no abdominal tenderness.   Musculoskeletal:         General: Normal range of motion.      Cervical back: Full passive range of motion without pain, normal range of motion and neck supple.     Neurological: He is alert. He has normal strength. Coordination and gait normal. GCS motor subscore is 6.   Skin: Skin is warm and dry. Capillary refill takes less than 2 seconds.   Psychiatric: His mood appears anxious. His affect is inappropriate. His speech is rapid and/or pressured. His speech is not slurred. He is agitated, aggressive, hyperactive, actively hallucinating and combative. Thought content is paranoid and delusional. He expresses impulsivity and inappropriate judgment. He expresses homicidal ideation. He is inattentive.       Medical Screening  Exam   See Full Note    ED Course   Procedures  Labs Reviewed   COMPREHENSIVE METABOLIC PANEL - Abnormal       Result Value    Sodium 142      Potassium 4.1      Chloride 104      CO2 30      Anion Gap 12      Glucose 154 (*)     BUN 15      Creatinine 1.32 (*)     BUN/Creatinine Ratio 11      Calcium 9.1      Total Protein 8.5 (*)     Albumin 4.3      Globulin 4.2 (*)     A/G Ratio 1.0      Bilirubin, Total 0.4      Alk Phos 75      ALT 46      AST 19      eGFR 68     DRUG SCREEN, URINE (BEAKER) - Abnormal    Barbiturates, Urine Negative      Benzodiazepine, Urine Negative      Opiates, Urine Negative      Phencyclidine, Urine Negative      Amphetamine, Urine Negative      Cannabinoid, Urine Positive (*)     Cocaine, Urine Negative      Narrative:     The results of screening tests should be considered presumptive. Confirmatory testing is available upon request.    Cutoff Points:  PCP:         25ng/mL  AMPH:        500ng/mL  NAYE:        200ng/mL  NATHAN:        200ng/mL  THC:         50ng/mL  FAVIO:         300ng/mL  OPI:         2000ng/mL   URINALYSIS, REFLEX TO URINE CULTURE - Abnormal    Color, UA Dark Yellow      Clarity, UA Slightly Cloudy      pH, UA 5.5      Leukocytes, UA Negative      Nitrites, UA Negative      Protein, UA 30 (*)     Glucose,  (*)     Ketones, UA Trace      Urobilinogen, UA 1.0      Bilirubin, UA Small (*)     Blood, UA Negative      Specific Gravity, UA >=1.030 (*)    MAGNESIUM - Abnormal    Magnesium 1.6 (*)    ACETAMINOPHEN LEVEL - Abnormal    Acetaminophen <2 (*)    SALICYLATE LEVEL - Abnormal    Salicylate 1.4 (*)    CBC WITH DIFFERENTIAL - Abnormal    WBC 13.03 (*)     RBC 4.43 (*)     Hemoglobin 13.6      Hematocrit 42.4      MCV 95.7      MCH 30.7      MCHC 32.1      RDW 12.5      Platelet Count 323      MPV 9.4      Neutrophils % 81.7 (*)     Lymphocytes % 13.6 (*)     Monocytes % 4.1      Eosinophils % 0.2 (*)     Basophils % 0.1      Immature Granulocytes % 0.3      nRBC,  Auto 0.0      Neutrophils, Abs 10.66 (*)     Lymphocytes, Absolute 1.77      Monocytes, Absolute 0.53      Eosinophils, Absolute 0.02      Basophils, Absolute 0.01      Immature Granulocytes, Absolute 0.04      nRBC, Absolute 0.00      Diff Type Auto     URINALYSIS, MICROSCOPIC - Abnormal    WBC, UA 0-5      RBC, UA 0-3      Squamous Epithelial Cells, UA Rare      Mucus, UA Many (*)    CBC W/ AUTO DIFFERENTIAL    Narrative:     The following orders were created for panel order CBC auto differential.  Procedure                               Abnormality         Status                     ---------                               -----------         ------                     CBC with Differential[[id number]]       Abnormal            Final result                 Please view results for these tests on the individual orders.   ALCOHOL,MEDICAL (ETHANOL)    Ethanol Not Detected            Imaging Results    None          Medications   sodium chloride 0.9% bolus 1,000 mL 1,000 mL (0 mLs Intravenous Stopped 8/24/24 0853)   diphenhydrAMINE injection 50 mg (50 mg Intramuscular Given 8/24/24 0556)   LORazepam injection 2 mg (2 mg Intramuscular Given 8/24/24 0555)   haloperidol lactate injection 5 mg (5 mg Intramuscular Given 8/24/24 0600)   haloperidol lactate injection 5 mg (5 mg Intramuscular Given 8/24/24 0639)     Medical Decision Making  Patient brought to the ED by the mother for psych evaluation.  He was alert and oriented on arrival but noted to be agitated and experiencing flight ideas with hallucinations.  He was noted to be hypertensive and tachycardic.  Breath sounds are equal and clear bilaterally to auscultation.  We will attempt to initiate a psych workup including IV access, usual labs, and urine specimen with anticipated transfer to psych facility.  Plans for normal saline 1 L bolus for hydration.  05:50 repeat face-to-face evaluation was performed by myself at this time.  While attempting IV access, patient  became more agitated and violent and began swinging at staff and threatening verbal harm.  We did attempt verbal deescalation but he was not responsive to this therapy.  Police authorities responded to the incident and the patient became further threatening and violent.  He was physically restrained for his safety and others and placed in their handcuffs where we were able to provide Haldol 5 mg IM, Benadryl 50 mg IM and Ativan 2 mg IM for anxiety and chemical sedation.  He was noted to be attempting to kick staff so he was placed in soft restraints in his lower extremities as well for further safety of the patient and staff.   remains at the bedside.  Labs were obtained at this time by lab personnel.  Violent/self-destructive restrained order was placed at this time as well along with a consult for tele psych services for additional recommendations.     06:34 Patient began screaming at deputies again and jerking his extremities against the restraints while threatening to kill everyone. Will administer an additional Haldol 5mg IM at this time.    0700 - Patient continues to have mood swings. [facility] police department here with patient. Patient continues to be restrained.     0745 - Patient was fed breakfast, patient ate good. Police department still here for safety concerns. Patient has threatened to leave. Patient's mother here also and reports this has been an ongoing issue for the past 8 months. States patient will not stay on his medications.     0800 - Patient is medically stable for pysch placement but continues to require restraints.     0853 - Patient continues to be in restraints, patient is resting at this time. Patient will be transferred to [facility] in [place], [place].    0930 - Physical exam completed, patient has soft restraints on bilateral feet, no bruising or edema noted, pulses strong. Patient is not currently in hand cuffs but [facility] police department officers are at  bedside. Patient is resting at this time, awakens easily. Resp even and unlabored, heart sounds normal. No bruising noted to wrists from having hand cuffs on earlier. Patient was placed in hand cuffs due to threatening the staff and swinging at them. Patient was chemically restrained 3 hours ago with Haldol and Benadryl. Patient denies any complaints at this time.      Amount and/or Complexity of Data Reviewed  Independent Historian:      Details: Patient is a 44-year-old male brought to the emergency department by his mother for psych evaluation.  Patient is well known to the ED and was seen here 2 weeks prior for the same complaints where he became agitated and violent requiring chemical restraints as well as treatment medically for an ALIZA.  He was transferred to [facility] ICU and ultimately transferred to [facility] for psych services.  The mother reports that he was discharged from their facility this past Wednesday and has been difficult to control since that time.  She reports that his behavior has gotten worse.  She states that he was rambling, making threats, and wandering around the yard at all hours of the day and night.  She reports that he was not been compliant with any of his medication.  He is currently noted to be speaking erratically, talking to people who are not there, and threatening to kill people.  He has a known past medical history of hypothyroidism, marijuana abuse, schizophrenia, and nicotine dependence.  Blood pressure is 174/105, temperature 98.1°, heart rate 111, respirations 22, and oxygen saturation 98% on room air.  Labs: ordered.     Details: CBC shows a WBC of 13.03, hemoglobin of 13.6, hematocrit of 42.4, platelet count 323.  ETOH shows none detected.  Magnesium 1.6.  Acetaminophen less than 2.  Salicylate 1.4.  CMP shows a glucose of 154, creatinine of 1.32, total protein 8.5, globulin of 4.2, and otherwise unremarkable.  ECG/medicine tests: ordered.     Details: EKG shows  sinus tachycardia at a rate of 115 bpm. No Stemi.    Risk  Prescription drug management.               ED Course as of 08/24/24 0936   Sat Aug 24, 2024   0647 Report given and care transferred to NOBLE Baker at this time. []      ED Course User Index  [MC] Frank Alex, NOBLE                           Clinical Impression:   Final diagnoses:  [R00.0] Tachycardia  [F23] Acute psychosis (Primary)  [R45.850] Homicidal ideation  [R45.89] Threatening to self  [F12.10] Marijuana abuse        ED Disposition Condition    Transfer to Another Facility Stable                  Tessie Baker, DARÍO  08/24/24 0809       Tessie Baker, [facility]  08/24/24 0819       Tessie Baker, [facility]  08/24/24 0832       Tessie Baker, [facility]  08/24/24 0855       Tessie Baker, [facility]  08/24/24 0931

## 2024-08-24 NOTE — ED PROVIDER NOTES
Encounter Date: 8/24/2024    SCRIBE #1 NOTE: I, Alma Marie, am scribing for, and in the presence of,  Tino Esquivel MD.       History     Chief Complaint   Patient presents with    Psychiatric Evaluation     Pt presents to ed via transfer from [facility] for psych placement      This patient is a 44 y.o. male brought to the emergency department by his mother for psych evaluation.  Patient is well known to the ED and was seen here 2 weeks prior for the same complaints where he became agitated and violent requiring chemical restraints as well as treatment medically for an ALIZA.  He was transferred to [facility] ICU and ultimately transferred to [facility] for psych services.  The mother reports that he was discharged from their facility this past Wednesday and has been difficult to control since that time.  She reports that his behavior has gotten worse.  She states that he was rambling, making threats, and wandering around the yard at all hours of the day and night.  She reports that he was not been compliant with any of his medication.  He is currently noted to be speaking erratically, talking to people who are not there, and threatening to kill people.  He has a known past medical history of hypothyroidism, marijuana abuse, schizophrenia, and nicotine dependence.  Patient gita suicidal thoughts, Hallucinations and harming other at present.    The history is provided by the patient. No  was used.     Review of patient's allergies indicates:  No Known Allergies  Past Medical History:   Diagnosis Date    Acquired hypothyroidism     Involuntary commitment 02/06/2024    Marijuana dependence     Nicotine dependence     Paranoid schizophrenia      History reviewed. No pertinent surgical history.  No family history on file.  Social History     Tobacco Use    Smoking status: Every Day     Current packs/day: 1.00     Types: Cigarettes     Passive exposure: Never    Smokeless tobacco: Never    Substance Use Topics    Alcohol use: Yes     Alcohol/week: 2.0 standard drinks of alcohol     Types: 2 Cans of beer per week    Drug use: Not Currently     Frequency: 3.0 times per week     Types: Marijuana     Review of Systems   Constitutional:  Negative for chills and fever.   HENT:  Negative for sore throat.    Respiratory:  Negative for shortness of breath.    Cardiovascular:  Negative for chest pain.   Gastrointestinal:  Negative for abdominal pain and vomiting.   Endocrine: Negative for polyuria.   Genitourinary:  Negative for flank pain and testicular pain.   Psychiatric/Behavioral:  Positive for behavioral problems and hallucinations.    All other systems reviewed and are negative.      Physical Exam     Initial Vitals [08/24/24 1123]   BP Pulse Resp Temp SpO2   119/79 87 14 98.4 °F (36.9 °C) 97 %      MAP       --         Physical Exam    Nursing note and vitals reviewed.  HENT:   Head: Normocephalic and atraumatic.   Mouth/Throat: Oropharynx is clear and moist.   Eyes: Pupils are equal, round, and reactive to light.   Neck: Neck supple.   Normal range of motion.  Cardiovascular:  Normal rate and regular rhythm.           Pulmonary/Chest: Effort normal and breath sounds normal.   Abdominal: Abdomen is soft. He exhibits no distension.   Musculoskeletal:         General: Normal range of motion.      Cervical back: Normal range of motion and neck supple.     Neurological: He is alert.   Skin: Skin is warm. Capillary refill takes less than 2 seconds.         ED Course   Procedures  Labs Reviewed   SARS-COV-2 RNA AMPLIFICATION, QUAL - Normal       Result Value    SARS COV-2 Molecular Negative      Narrative:     Negative SARS-CoV results should not be used as the sole basis for treatment or patient management decisions; negative results should be considered in the context of a patient's recent exposures, history and the presene of clinical signs and symptoms consistent with COVID-19.  Negative results should  be treated as presumptive and confirmed by molecular assay, if necessary for patient management.          Imaging Results    None          Medications   nicotine 21 mg/24 hr 1 patch (1 patch Transdermal Patch Applied 8/24/24 1305)     Medical Decision Making  Risk  OTC drugs.              Attending Attestation:           Physician Attestation for Scribe:  Physician Attestation Statement for Scribe #1: I, Tino Esquivel MD, reviewed documentation, as scribed by Alma Salazar in my presence, and it is both accurate and complete.             ED Course as of 08/24/24 1549   Sat Aug 24, 2024   1144 Medical decision-making:  Differential diagnosis includes psychosis, combative behavior, depression, anxiety, schizophrenia, alcohol abuse, drug abuse, noncompliance with medication. [BB]   1144 No labs or imaging were performed on this patient because he had all workup performed at outside facility before being transferred here.  We will attempt transfer to a psychiatric facility. [BB]   1148 Review of labs from outside facility from earlier this morning was significant only for positive marijuana on drug screen. [BB]   1149 Patient has been medically cleared for psychiatric transfer. [BB]   1548 Patient was accepted in transfer to [facility]. [BB]      ED Course User Index  [BB] Tino Esquivel MD                           Clinical Impression:  Final diagnoses:  [R45.1] Agitation (Primary)  [R45.6] Violent behavior  [F29] Psychosis, unspecified psychosis type          ED Disposition Condition    Transfer to Psych Facility Stable          ED Prescriptions    None       Follow-up Information    None          Tino Esquivel MD  08/24/24 7923